# Patient Record
Sex: MALE | Race: BLACK OR AFRICAN AMERICAN | NOT HISPANIC OR LATINO | Employment: FULL TIME | URBAN - METROPOLITAN AREA
[De-identification: names, ages, dates, MRNs, and addresses within clinical notes are randomized per-mention and may not be internally consistent; named-entity substitution may affect disease eponyms.]

---

## 2017-03-02 ENCOUNTER — MEDICAL CORRESPONDENCE (OUTPATIENT)
Dept: HEALTH INFORMATION MANAGEMENT | Facility: CLINIC | Age: 37
End: 2017-03-02

## 2017-04-10 DIAGNOSIS — Z94.0 KIDNEY REPLACED BY TRANSPLANT: Primary | ICD-10-CM

## 2017-04-10 LAB
ANION GAP SERPL CALCULATED.3IONS-SCNC: 6 MMOL/L (ref 3–14)
BUN SERPL-MCNC: 19 MG/DL (ref 7–30)
CALCIUM SERPL-MCNC: 9.3 MG/DL (ref 8.5–10.1)
CHLORIDE SERPL-SCNC: 102 MMOL/L (ref 94–109)
CO2 SERPL-SCNC: 29 MMOL/L (ref 20–32)
CREAT SERPL-MCNC: 1.05 MG/DL (ref 0.66–1.25)
ERYTHROCYTE [DISTWIDTH] IN BLOOD BY AUTOMATED COUNT: 12.7 % (ref 10–15)
GFR SERPL CREATININE-BSD FRML MDRD: 79 ML/MIN/1.7M2
GLUCOSE SERPL-MCNC: 88 MG/DL (ref 70–99)
HCT VFR BLD AUTO: 45.1 % (ref 40–53)
HGB BLD-MCNC: 14.8 G/DL (ref 13.3–17.7)
MCH RBC QN AUTO: 29.8 PG (ref 26.5–33)
MCHC RBC AUTO-ENTMCNC: 32.8 G/DL (ref 31.5–36.5)
MCV RBC AUTO: 91 FL (ref 78–100)
PLATELET # BLD AUTO: 262 10E9/L (ref 150–450)
POTASSIUM SERPL-SCNC: 4.3 MMOL/L (ref 3.4–5.3)
RBC # BLD AUTO: 4.97 10E12/L (ref 4.4–5.9)
SODIUM SERPL-SCNC: 137 MMOL/L (ref 133–144)
WBC # BLD AUTO: 4 10E9/L (ref 4–11)

## 2017-04-10 PROCEDURE — 36415 COLL VENOUS BLD VENIPUNCTURE: CPT

## 2017-04-10 PROCEDURE — 85027 COMPLETE CBC AUTOMATED: CPT

## 2017-04-10 PROCEDURE — 80048 BASIC METABOLIC PNL TOTAL CA: CPT

## 2017-04-10 PROCEDURE — 80197 ASSAY OF TACROLIMUS: CPT

## 2017-04-11 LAB
TACROLIMUS BLD-MCNC: 3.6 UG/L (ref 5–15)
TME LAST DOSE: 2000 H

## 2017-04-14 ENCOUNTER — OFFICE VISIT (OUTPATIENT)
Dept: FAMILY MEDICINE | Facility: CLINIC | Age: 37
End: 2017-04-14
Payer: COMMERCIAL

## 2017-04-14 VITALS
HEIGHT: 70 IN | WEIGHT: 131.3 LBS | HEART RATE: 82 BPM | TEMPERATURE: 97.4 F | SYSTOLIC BLOOD PRESSURE: 130 MMHG | DIASTOLIC BLOOD PRESSURE: 82 MMHG | BODY MASS INDEX: 18.8 KG/M2 | OXYGEN SATURATION: 100 %

## 2017-04-14 DIAGNOSIS — A09 DIARRHEA OF INFECTIOUS ORIGIN: ICD-10-CM

## 2017-04-14 DIAGNOSIS — Z94.0 STATUS POST KIDNEY TRANSPLANT: ICD-10-CM

## 2017-04-14 DIAGNOSIS — N18.6 END-STAGE RENAL DISEASE (H): ICD-10-CM

## 2017-04-14 DIAGNOSIS — K52.9 GASTROENTERITIS: Primary | ICD-10-CM

## 2017-04-14 PROCEDURE — 87506 IADNA-DNA/RNA PROBE TQ 6-11: CPT | Performed by: FAMILY MEDICINE

## 2017-04-14 PROCEDURE — 99214 OFFICE O/P EST MOD 30 MIN: CPT | Performed by: FAMILY MEDICINE

## 2017-04-14 PROCEDURE — 87338 HPYLORI STOOL AG IA: CPT | Performed by: FAMILY MEDICINE

## 2017-04-14 RX ORDER — AMLODIPINE BESYLATE 5 MG/1
5 TABLET ORAL
COMMUNITY
Start: 2017-02-27 | End: 2021-11-04 | Stop reason: ALTCHOICE

## 2017-04-14 RX ORDER — MYCOPHENOLIC ACID 180 MG/1
180 TABLET, DELAYED RELEASE ORAL
COMMUNITY
Start: 2017-02-27

## 2017-04-14 NOTE — MR AVS SNAPSHOT
"              After Visit Summary   4/14/2017    Yeny Calvin    MRN: [id number]           Patient Information     Date Of Birth          1980        Visit Information        Provider Department      4/14/2017 11:00 AM Memo Rader MD [facility]        Today's Diagnoses     Gastroenteritis    -  1    Diarrhea of infectious origin        Status post kidney transplant        End-stage renal disease (H)           Follow-ups after your visit        Future tests that were ordered for you today     Open Future Orders        Priority Expected Expires Ordered    Enteric Bacteria and Virus Panel by LEOBARDO Stool Routine  4/14/2018 4/14/2017    H Pylori antigen, stool Routine  5/14/2017 4/14/2017            Who to contact     If you have questions or need follow up information about today's clinic visit or your schedule please contact [facility] directly at [phone].  Normal or non-critical lab and imaging results will be communicated to you by MyChart, letter or phone within 4 business days after the clinic has received the results. If you do not hear from us within 7 days, please contact the clinic through MyChart or phone. If you have a critical or abnormal lab result, we will notify you by phone as soon as possible.  Submit refill requests through [employer] or call your pharmacy and they will forward the refill request to us. Please allow 3 business days for your refill to be completed.          Additional Information About Your Visit        MyChart Information     Lumeta lets you send messages to your doctor, view your test results, renew your prescriptions, schedule appointments and more. To sign up, go to [email or web address] . Click on \"Log in\" on the left side of the screen, which will take you to the Welcome page. Then click on \"Sign up Now\" on the right side of the page.     You will be asked to enter the access code listed below, as well as some personal information. " "Please follow the directions to create your username and password.     Your access code is: [email or web address]  Expires: 2017 10:11 AM     Your access code will  in 90 days. If you need help or a new code, please call your [place] clinic or [phone].        Care EveryWhere ID     This is your Care EveryWhere ID. This could be used by other organizations to access your [place] medical records  [id number]        Your Vitals Were     Pulse Temperature Height Pulse Oximetry BMI (Body Mass Index)       82 97.4  F (36.3  C) (Oral) 5' 10\" (1.778 m) 100% 18.84 kg/m2        Blood Pressure from Last 3 Encounters:   17 130/82   16 140/84   16 108/74    Weight from Last 3 Encounters:   17 131 lb 4.8 oz (59.6 kg)   16 126 lb (57.2 kg)   16 123 lb 8 oz (56 kg)               Primary Care Provider Office Phone # Fax #    Leslie Warfa [phone]       [place] [id number] [facility] [id number]        Thank you!     Thank you for choosing [facility]  for your care. Our goal is always to provide you with excellent care. Hearing back from our patients is one way we can continue to improve our services. Please take a few minutes to complete the written survey that you may receive in the mail after your visit with us. Thank you!             Your Updated Medication List - Protect others around you: Learn how to safely use, store and throw away your medicines at www.disposemymeds.org.          This list is accurate as of: 17 12:08 PM.  Always use your most recent med list.                   Brand Name Dispense Instructions for use    amLODIPine 5 MG tablet    NORVASC     Take 5 mg by mouth       lisinopril 10 MG tablet    PRINIVIL/ZESTRIL     Take 10 mg by mouth daily       mycophenolic acid 180 MG EC tablet    MYFORTIC - GENERIC EQUIVALENT     Take 180 mg by mouth       tacrolimus capsule   Generic drug:  tacrolimus      Take 1 mg by mouth 2 " times daily

## 2017-04-14 NOTE — PROGRESS NOTES
SUBJECTIVE:                                                    Yeny Calvin is a 37 year old male who presents to clinic today for the following health issues:    Abdominal Pain      Duration: 3 days    Description (location/character/radiation): middle of stomach       Associated flank pain: None    Intensity:  moderate    Accompanying signs and symptoms:        Fever/Chills: no        Gas/Bloating: YES-        Nausea/vomitting: no        Diarrhea: YES- 3 times        Dysuria or Hematuria: no     History (previous similar pain/trauma/previous testing): none    Precipitating or alleviating factors:       Pain worse with eating/BM/urination: no       Pain relieved by BM: YES- get less painful - but still there    Therapies tried and outcome: None    LMP:  not applicable       ROS: As per HPI.  Constitutional: + fevers/sweats/chills  : no burning or urgency with urination  Skin: no rash    I have reviewed and updated the patient's past medical history in the EMR. Current problems are:    Patient Active Problem List   Diagnosis     Undescended testis     Testicular hypofunction     Atrophy of both testes     Status post kidney transplant     End-stage renal disease (H)     Hepatitis B carrier     History of kidney transplant     Hypertension     Seasonal allergic rhinitis     Past Surgical History:   Procedure Laterality Date     bilateral orchidopexy  1994     C TRANSPLANTATION OF KIDNEY       HYPERTENSION MG         Social History   Substance Use Topics     Smoking status: Never Smoker     Smokeless tobacco: Never Used     Alcohol use No     Family History   Problem Relation Age of Onset     Unknown/Adopted Mother      Unknown/Adopted Father          Allergies, reviewed:   No Known Allergies    Current Outpatient Prescriptions   Medication Sig Dispense Refill     amLODIPine (NORVASC) 5 MG tablet Take 5 mg by mouth       mycophenolic acid (MYFORTIC - GENERIC EQUIVALENT) 180 MG EC tablet Take 180 mg by mouth    "    lisinopril (PRINIVIL,ZESTRIL) 10 MG tablet Take 10 mg by mouth daily       tacrolimus (PROGRAF) 1 MG capsule Take 1 mg by mouth 2 times daily          Objective:  /82  Pulse 82  Temp 97.4  F (36.3  C) (Oral)  Ht 5' 10\" (1.778 m)  Wt 131 lb 4.8 oz (59.6 kg)  SpO2 100%  BMI 18.84 kg/m2  General Appearance: Pleasant, alert, WN/WD in no acute respiratory distress.  Chest/Respiratory Exam: Normal, comfortable, easy respirations. Chest wall normal. Lungs are clear to auscultation. No wheezing, crackles, or rhonchi.  Cardiovascular Exam: Regular rate and rhythm. No murmur, gallop, or rubs. No pedal edema.  Gastrointestinal Exam: Soft, mildly tender lower abdmen, not tender RLQ  Musculoskeletal Exam: Back is non-tender, full ROM of upper and lower extremities.  Skin: no rash, warm and dry.  Neurologic Exam: Nonfocal, no tremor. Normal gait.  Psychiatric Exam: Alert - appropriate, normal affect    ASSESSMENT/PLAN:    ICD-10-CM    1. Gastroenteritis K52.9 Enteric Bacteria and Virus Panel by LEOBARDO Stool     H Pylori antigen, stool   2. Diarrhea of infectious origin A09 Enteric Bacteria and Virus Panel by LEOBARDO Stool     H Pylori antigen, stool   3. Status post kidney transplant Z94.0       abdominal pain Differential Dx includes Gall bladder issue, ulcer, dyspepsia, pancreatitis, bowel problem, abdominal wall pain, gastroenteritis    Because pt is high risk (kidney transplant and on immunosuppesants)  Doing expanded ID workup today    Also Hx of H pylori, (Treated) checking for cure    Follow up: Return as needed if symptoms fail to improve    Memo Rader MD MPH    "

## 2017-04-14 NOTE — NURSING NOTE
"Chief Complaint   Patient presents with     Establish Care     Gastrointestinal Problem     /82  Pulse 82  Temp 97.4  F (36.3  C) (Oral)  Ht 5' 10\" (1.778 m)  Wt 131 lb 4.8 oz (59.6 kg)  SpO2 100%  BMI 18.84 kg/m2 Estimated body mass index is 18.84 kg/(m^2) as calculated from the following:    Height as of this encounter: 5' 10\" (1.778 m).    Weight as of this encounter: 131 lb 4.8 oz (59.6 kg).  BP completed using cuff size: regular       Health Maintenance due pending provider review:  NONE    n/a      Noam Bullard CMA  "

## 2017-04-15 LAB
CAMPYLOBACTER GROUP BY NAT: NOT DETECTED
ENTERIC PATHOGEN COMMENT: ABNORMAL
NOROVIRUS I AND II BY NAT: ABNORMAL
ROTAVIRUS A BY NAT: NOT DETECTED
SALMONELLA SPECIES BY NAT: NOT DETECTED
SHIGA TOXIN 1 GENE BY NAT: NOT DETECTED
SHIGA TOXIN 2 GENE BY NAT: NOT DETECTED
SHIGELLA SP+EIEC IPAH STL QL NAA+PROBE: NOT DETECTED
VIBRIO GROUP BY NAT: NOT DETECTED
YERSINIA ENTEROCOLITICA BY NAT: NOT DETECTED

## 2017-04-17 LAB
H PYLORI AG STL QL IA: NORMAL
MICRO REPORT STATUS: NORMAL
SPECIMEN SOURCE: NORMAL

## 2017-07-10 DIAGNOSIS — Z94.0 KIDNEY REPLACED BY TRANSPLANT: ICD-10-CM

## 2017-07-10 LAB
ALBUMIN UR-MCNC: NEGATIVE MG/DL
APPEARANCE UR: CLEAR
BILIRUB UR QL STRIP: NEGATIVE
COLOR UR AUTO: YELLOW
DEPRECATED CALCIDIOL+CALCIFEROL SERPL-MC: 67 UG/L (ref 20–75)
ERYTHROCYTE [DISTWIDTH] IN BLOOD BY AUTOMATED COUNT: 13.4 % (ref 10–15)
GLUCOSE UR STRIP-MCNC: NEGATIVE MG/DL
HBA1C MFR BLD: 5.4 % (ref 4.3–6)
HCT VFR BLD AUTO: 43.4 % (ref 40–53)
HGB BLD-MCNC: 14.8 G/DL (ref 13.3–17.7)
HGB UR QL STRIP: NEGATIVE
KETONES UR STRIP-MCNC: NEGATIVE MG/DL
LEUKOCYTE ESTERASE UR QL STRIP: NEGATIVE
MCH RBC QN AUTO: 30.6 PG (ref 26.5–33)
MCHC RBC AUTO-ENTMCNC: 34.1 G/DL (ref 31.5–36.5)
MCV RBC AUTO: 90 FL (ref 78–100)
NITRATE UR QL: NEGATIVE
PH UR STRIP: 5.5 PH (ref 5–7)
PLATELET # BLD AUTO: 263 10E9/L (ref 150–450)
PROT UR-MCNC: <0.05 G/L
PROT/CREAT 24H UR: NORMAL G/G CR (ref 0–0.2)
PTH-INTACT SERPL-MCNC: 80 PG/ML (ref 12–72)
RBC # BLD AUTO: 4.84 10E12/L (ref 4.4–5.9)
SP GR UR STRIP: <=1.005 (ref 1–1.03)
TACROLIMUS BLD-MCNC: 3 UG/L (ref 5–15)
TME LAST DOSE: 2000 H
URN SPEC COLLECT METH UR: NORMAL
UROBILINOGEN UR STRIP-ACNC: 0.2 EU/DL (ref 0.2–1)
WBC # BLD AUTO: 3.8 10E9/L (ref 4–11)

## 2017-07-10 PROCEDURE — 85027 COMPLETE CBC AUTOMATED: CPT | Performed by: FAMILY MEDICINE

## 2017-07-10 PROCEDURE — 82248 BILIRUBIN DIRECT: CPT | Performed by: FAMILY MEDICINE

## 2017-07-10 PROCEDURE — 87799 DETECT AGENT NOS DNA QUANT: CPT | Performed by: FAMILY MEDICINE

## 2017-07-10 PROCEDURE — 84460 ALANINE AMINO (ALT) (SGPT): CPT | Performed by: FAMILY MEDICINE

## 2017-07-10 PROCEDURE — 83970 ASSAY OF PARATHORMONE: CPT | Performed by: FAMILY MEDICINE

## 2017-07-10 PROCEDURE — 82247 BILIRUBIN TOTAL: CPT | Performed by: FAMILY MEDICINE

## 2017-07-10 PROCEDURE — 82306 VITAMIN D 25 HYDROXY: CPT | Performed by: FAMILY MEDICINE

## 2017-07-10 PROCEDURE — 84450 TRANSFERASE (AST) (SGOT): CPT | Performed by: FAMILY MEDICINE

## 2017-07-10 PROCEDURE — 83735 ASSAY OF MAGNESIUM: CPT | Performed by: FAMILY MEDICINE

## 2017-07-10 PROCEDURE — 81003 URINALYSIS AUTO W/O SCOPE: CPT | Performed by: FAMILY MEDICINE

## 2017-07-10 PROCEDURE — 84156 ASSAY OF PROTEIN URINE: CPT | Performed by: FAMILY MEDICINE

## 2017-07-10 PROCEDURE — 84155 ASSAY OF PROTEIN SERUM: CPT | Performed by: FAMILY MEDICINE

## 2017-07-10 PROCEDURE — 80069 RENAL FUNCTION PANEL: CPT | Performed by: FAMILY MEDICINE

## 2017-07-10 PROCEDURE — 80197 ASSAY OF TACROLIMUS: CPT | Performed by: FAMILY MEDICINE

## 2017-07-10 PROCEDURE — 83036 HEMOGLOBIN GLYCOSYLATED A1C: CPT | Performed by: FAMILY MEDICINE

## 2017-07-10 PROCEDURE — 84075 ASSAY ALKALINE PHOSPHATASE: CPT | Performed by: FAMILY MEDICINE

## 2017-07-10 PROCEDURE — 36415 COLL VENOUS BLD VENIPUNCTURE: CPT | Performed by: FAMILY MEDICINE

## 2017-07-11 LAB
ALBUMIN SERPL-MCNC: 4.1 G/DL (ref 3.4–5)
ALP SERPL-CCNC: 63 U/L (ref 40–150)
ALT SERPL W P-5'-P-CCNC: 30 U/L (ref 0–70)
ANION GAP SERPL CALCULATED.3IONS-SCNC: 8 MMOL/L (ref 3–14)
AST SERPL W P-5'-P-CCNC: 25 U/L (ref 0–45)
BILIRUB DIRECT SERPL-MCNC: 0.1 MG/DL (ref 0–0.2)
BILIRUB SERPL-MCNC: 0.6 MG/DL (ref 0.2–1.3)
BUN SERPL-MCNC: 12 MG/DL (ref 7–30)
CALCIUM SERPL-MCNC: 9 MG/DL (ref 8.5–10.1)
CHLORIDE SERPL-SCNC: 99 MMOL/L (ref 94–109)
CO2 SERPL-SCNC: 27 MMOL/L (ref 20–32)
CREAT SERPL-MCNC: 1.01 MG/DL (ref 0.66–1.25)
GFR SERPL CREATININE-BSD FRML MDRD: 82 ML/MIN/1.7M2
GLUCOSE SERPL-MCNC: 87 MG/DL (ref 70–99)
MAGNESIUM SERPL-MCNC: 2 MG/DL (ref 1.6–2.3)
PHOSPHATE SERPL-MCNC: 3 MG/DL (ref 2.5–4.5)
POTASSIUM SERPL-SCNC: 4.2 MMOL/L (ref 3.4–5.3)
PROT SERPL-MCNC: 7.6 G/DL (ref 6.8–8.8)
SODIUM SERPL-SCNC: 134 MMOL/L (ref 133–144)

## 2017-07-12 LAB
BKV DNA # SPEC NAA+PROBE: NORMAL COPIES/ML
BKV DNA SPEC NAA+PROBE-LOG#: NORMAL LOG COPIES/ML
SPECIMEN SOURCE: NORMAL

## 2017-12-27 DIAGNOSIS — Z94.0 KIDNEY REPLACED BY TRANSPLANT: Primary | ICD-10-CM

## 2017-12-27 DIAGNOSIS — Z79.899 DRUG THERAPY: ICD-10-CM

## 2018-01-29 DIAGNOSIS — Z94.0 KIDNEY REPLACED BY TRANSPLANT: ICD-10-CM

## 2018-01-29 DIAGNOSIS — Z79.899 DRUG THERAPY: ICD-10-CM

## 2018-01-29 LAB
ALBUMIN SERPL-MCNC: 4.1 G/DL (ref 3.4–5)
ALBUMIN UR-MCNC: NEGATIVE MG/DL
ALP SERPL-CCNC: 67 U/L (ref 40–150)
ALT SERPL W P-5'-P-CCNC: 19 U/L (ref 0–70)
ANION GAP SERPL CALCULATED.3IONS-SCNC: 8 MMOL/L (ref 3–14)
APPEARANCE UR: CLEAR
AST SERPL W P-5'-P-CCNC: 24 U/L (ref 0–45)
BASOPHILS # BLD AUTO: 0 10E9/L (ref 0–0.2)
BASOPHILS NFR BLD AUTO: 0.9 %
BILIRUB DIRECT SERPL-MCNC: <0.1 MG/DL (ref 0–0.2)
BILIRUB SERPL-MCNC: 0.5 MG/DL (ref 0.2–1.3)
BILIRUB UR QL STRIP: NEGATIVE
BUN SERPL-MCNC: 11 MG/DL (ref 7–30)
CALCIUM SERPL-MCNC: 9.4 MG/DL (ref 8.5–10.1)
CHLORIDE SERPL-SCNC: 100 MMOL/L (ref 94–109)
CHOLEST SERPL-MCNC: 242 MG/DL
CO2 SERPL-SCNC: 27 MMOL/L (ref 20–32)
COLOR UR AUTO: YELLOW
CREAT SERPL-MCNC: 1.08 MG/DL (ref 0.66–1.25)
DEPRECATED CALCIDIOL+CALCIFEROL SERPL-MC: 39 UG/L (ref 20–75)
DIFFERENTIAL METHOD BLD: NORMAL
EOSINOPHIL # BLD AUTO: 0.1 10E9/L (ref 0–0.7)
EOSINOPHIL NFR BLD AUTO: 1.1 %
ERYTHROCYTE [DISTWIDTH] IN BLOOD BY AUTOMATED COUNT: 13 % (ref 10–15)
GFR SERPL CREATININE-BSD FRML MDRD: 76 ML/MIN/1.7M2
GLUCOSE SERPL-MCNC: 96 MG/DL (ref 70–99)
GLUCOSE UR STRIP-MCNC: NEGATIVE MG/DL
HBA1C MFR BLD: 5.2 % (ref 4.3–6)
HCT VFR BLD AUTO: 46.7 % (ref 40–53)
HDLC SERPL-MCNC: 70 MG/DL
HGB BLD-MCNC: 15.9 G/DL (ref 13.3–17.7)
HGB UR QL STRIP: NEGATIVE
KETONES UR STRIP-MCNC: NEGATIVE MG/DL
LDLC SERPL CALC-MCNC: 147 MG/DL
LEUKOCYTE ESTERASE UR QL STRIP: NEGATIVE
LYMPHOCYTES # BLD AUTO: 2 10E9/L (ref 0.8–5.3)
LYMPHOCYTES NFR BLD AUTO: 46.2 %
MAGNESIUM SERPL-MCNC: 2 MG/DL (ref 1.6–2.3)
MCH RBC QN AUTO: 30.4 PG (ref 26.5–33)
MCHC RBC AUTO-ENTMCNC: 34 G/DL (ref 31.5–36.5)
MCV RBC AUTO: 89 FL (ref 78–100)
MONOCYTES # BLD AUTO: 0.3 10E9/L (ref 0–1.3)
MONOCYTES NFR BLD AUTO: 7.7 %
NEUTROPHILS # BLD AUTO: 1.9 10E9/L (ref 1.6–8.3)
NEUTROPHILS NFR BLD AUTO: 44.1 %
NITRATE UR QL: NEGATIVE
NONHDLC SERPL-MCNC: 172 MG/DL
PH UR STRIP: 6.5 PH (ref 5–7)
PHOSPHATE SERPL-MCNC: 3 MG/DL (ref 2.5–4.5)
PLATELET # BLD AUTO: 264 10E9/L (ref 150–450)
POTASSIUM SERPL-SCNC: 4.3 MMOL/L (ref 3.4–5.3)
PROT SERPL-MCNC: 7.7 G/DL (ref 6.8–8.8)
PROT UR-MCNC: <0.05 G/L
PROT/CREAT 24H UR: NORMAL G/G CR (ref 0–0.2)
PTH-INTACT SERPL-MCNC: 84 PG/ML (ref 12–72)
RBC # BLD AUTO: 5.23 10E12/L (ref 4.4–5.9)
SODIUM SERPL-SCNC: 135 MMOL/L (ref 133–144)
SOURCE: NORMAL
SP GR UR STRIP: <=1.005 (ref 1–1.03)
TACROLIMUS BLD-MCNC: 3.8 UG/L (ref 5–15)
TME LAST DOSE: 2140 H
TRIGL SERPL-MCNC: 125 MG/DL
UROBILINOGEN UR STRIP-ACNC: 0.2 EU/DL (ref 0.2–1)
WBC # BLD AUTO: 4.4 10E9/L (ref 4–11)

## 2018-01-29 PROCEDURE — 84156 ASSAY OF PROTEIN URINE: CPT

## 2018-01-29 PROCEDURE — 81003 URINALYSIS AUTO W/O SCOPE: CPT

## 2018-01-29 PROCEDURE — 84075 ASSAY ALKALINE PHOSPHATASE: CPT

## 2018-01-29 PROCEDURE — 84450 TRANSFERASE (AST) (SGOT): CPT

## 2018-01-29 PROCEDURE — 84155 ASSAY OF PROTEIN SERUM: CPT

## 2018-01-29 PROCEDURE — 82248 BILIRUBIN DIRECT: CPT

## 2018-01-29 PROCEDURE — 36415 COLL VENOUS BLD VENIPUNCTURE: CPT

## 2018-01-29 PROCEDURE — 83970 ASSAY OF PARATHORMONE: CPT

## 2018-01-29 PROCEDURE — 83735 ASSAY OF MAGNESIUM: CPT

## 2018-01-29 PROCEDURE — 83036 HEMOGLOBIN GLYCOSYLATED A1C: CPT

## 2018-01-29 PROCEDURE — 84460 ALANINE AMINO (ALT) (SGPT): CPT

## 2018-01-29 PROCEDURE — 82306 VITAMIN D 25 HYDROXY: CPT

## 2018-01-29 PROCEDURE — 82247 BILIRUBIN TOTAL: CPT

## 2018-01-29 PROCEDURE — 80069 RENAL FUNCTION PANEL: CPT

## 2018-01-29 PROCEDURE — 85025 COMPLETE CBC W/AUTO DIFF WBC: CPT

## 2018-01-29 PROCEDURE — 80061 LIPID PANEL: CPT

## 2018-01-29 PROCEDURE — 80197 ASSAY OF TACROLIMUS: CPT

## 2018-02-27 DIAGNOSIS — N46.9 MALE STERILITY: Primary | ICD-10-CM

## 2018-02-27 PROCEDURE — 86803 HEPATITIS C AB TEST: CPT

## 2018-02-27 PROCEDURE — 87389 HIV-1 AG W/HIV-1&-2 AB AG IA: CPT

## 2018-02-27 PROCEDURE — 87340 HEPATITIS B SURFACE AG IA: CPT

## 2018-02-27 PROCEDURE — 36415 COLL VENOUS BLD VENIPUNCTURE: CPT

## 2018-02-28 LAB
HBV SURFACE AG SERPL QL IA: NONREACTIVE
HCV AB SERPL QL IA: NONREACTIVE
HIV 1+2 AB+HIV1 P24 AG SERPL QL IA: NONREACTIVE

## 2018-03-16 ENCOUNTER — MEDICAL CORRESPONDENCE (OUTPATIENT)
Dept: HEALTH INFORMATION MANAGEMENT | Facility: CLINIC | Age: 38
End: 2018-03-16

## 2018-03-16 DIAGNOSIS — Z94.0 HISTORY OF KIDNEY TRANSPLANT: Primary | ICD-10-CM

## 2018-03-16 DIAGNOSIS — Z79.899 DRUG THERAPY: ICD-10-CM

## 2018-07-13 ENCOUNTER — TRANSFERRED RECORDS (OUTPATIENT)
Dept: HEALTH INFORMATION MANAGEMENT | Facility: CLINIC | Age: 38
End: 2018-07-13

## 2018-08-27 DIAGNOSIS — Z94.0 HISTORY OF KIDNEY TRANSPLANT: ICD-10-CM

## 2018-08-27 DIAGNOSIS — Z79.899 DRUG THERAPY: ICD-10-CM

## 2018-08-27 DIAGNOSIS — Z94.0 KIDNEY REPLACED BY TRANSPLANT: ICD-10-CM

## 2018-08-27 LAB
ALBUMIN SERPL-MCNC: 4.2 G/DL (ref 3.4–5)
ALBUMIN UR-MCNC: NEGATIVE MG/DL
ALP SERPL-CCNC: 67 U/L (ref 40–150)
ALT SERPL W P-5'-P-CCNC: 21 U/L (ref 0–70)
ANION GAP SERPL CALCULATED.3IONS-SCNC: 9 MMOL/L (ref 3–14)
APPEARANCE UR: CLEAR
AST SERPL W P-5'-P-CCNC: 21 U/L (ref 0–45)
BASOPHILS # BLD AUTO: 0 10E9/L (ref 0–0.2)
BASOPHILS NFR BLD AUTO: 0.6 %
BILIRUB DIRECT SERPL-MCNC: 0.1 MG/DL (ref 0–0.2)
BILIRUB SERPL-MCNC: 0.7 MG/DL (ref 0.2–1.3)
BILIRUB UR QL STRIP: NEGATIVE
BUN SERPL-MCNC: 18 MG/DL (ref 7–30)
CALCIUM SERPL-MCNC: 9.1 MG/DL (ref 8.5–10.1)
CHLORIDE SERPL-SCNC: 103 MMOL/L (ref 94–109)
CHOLEST SERPL-MCNC: 215 MG/DL
CO2 SERPL-SCNC: 26 MMOL/L (ref 20–32)
COLOR UR AUTO: YELLOW
CREAT SERPL-MCNC: 1.09 MG/DL (ref 0.66–1.25)
DEPRECATED CALCIDIOL+CALCIFEROL SERPL-MC: 39 UG/L (ref 20–75)
DIFFERENTIAL METHOD BLD: ABNORMAL
EOSINOPHIL # BLD AUTO: 0.1 10E9/L (ref 0–0.7)
EOSINOPHIL NFR BLD AUTO: 1.4 %
ERYTHROCYTE [DISTWIDTH] IN BLOOD BY AUTOMATED COUNT: 13.7 % (ref 10–15)
GFR SERPL CREATININE-BSD FRML MDRD: 75 ML/MIN/1.7M2
GLUCOSE SERPL-MCNC: 86 MG/DL (ref 70–99)
GLUCOSE UR STRIP-MCNC: NEGATIVE MG/DL
HBA1C MFR BLD: 5.4 % (ref 0–5.6)
HCT VFR BLD AUTO: 46.3 % (ref 40–53)
HDLC SERPL-MCNC: 53 MG/DL
HGB BLD-MCNC: 15.5 G/DL (ref 13.3–17.7)
HGB UR QL STRIP: NEGATIVE
KETONES UR STRIP-MCNC: NEGATIVE MG/DL
LDLC SERPL CALC-MCNC: 138 MG/DL
LEUKOCYTE ESTERASE UR QL STRIP: NEGATIVE
LYMPHOCYTES # BLD AUTO: 1.7 10E9/L (ref 0.8–5.3)
LYMPHOCYTES NFR BLD AUTO: 47.8 %
MAGNESIUM SERPL-MCNC: 1.8 MG/DL (ref 1.6–2.3)
MCH RBC QN AUTO: 30.1 PG (ref 26.5–33)
MCHC RBC AUTO-ENTMCNC: 33.5 G/DL (ref 31.5–36.5)
MCV RBC AUTO: 90 FL (ref 78–100)
MONOCYTES # BLD AUTO: 0.3 10E9/L (ref 0–1.3)
MONOCYTES NFR BLD AUTO: 7.2 %
NEUTROPHILS # BLD AUTO: 1.5 10E9/L (ref 1.6–8.3)
NEUTROPHILS NFR BLD AUTO: 43 %
NITRATE UR QL: NEGATIVE
NONHDLC SERPL-MCNC: 162 MG/DL
PH UR STRIP: 5.5 PH (ref 5–7)
PHOSPHATE SERPL-MCNC: 3.2 MG/DL (ref 2.5–4.5)
PLATELET # BLD AUTO: 236 10E9/L (ref 150–450)
POTASSIUM SERPL-SCNC: 4.1 MMOL/L (ref 3.4–5.3)
PROT SERPL-MCNC: 7.8 G/DL (ref 6.8–8.8)
PROT UR-MCNC: <0.05 G/L
PROT/CREAT 24H UR: NORMAL G/G CR (ref 0–0.2)
PTH-INTACT SERPL-MCNC: 97 PG/ML (ref 18–80)
RBC # BLD AUTO: 5.15 10E12/L (ref 4.4–5.9)
SODIUM SERPL-SCNC: 138 MMOL/L (ref 133–144)
SOURCE: NORMAL
SP GR UR STRIP: <=1.005 (ref 1–1.03)
TACROLIMUS BLD-MCNC: 3.9 UG/L (ref 5–15)
TME LAST DOSE: 2130 H
TRIGL SERPL-MCNC: 120 MG/DL
UROBILINOGEN UR STRIP-ACNC: 0.2 EU/DL (ref 0.2–1)
WBC # BLD AUTO: 3.5 10E9/L (ref 4–11)

## 2018-08-27 PROCEDURE — 82306 VITAMIN D 25 HYDROXY: CPT

## 2018-08-27 PROCEDURE — 80069 RENAL FUNCTION PANEL: CPT

## 2018-08-27 PROCEDURE — 83970 ASSAY OF PARATHORMONE: CPT

## 2018-08-27 PROCEDURE — 83735 ASSAY OF MAGNESIUM: CPT

## 2018-08-27 PROCEDURE — 84155 ASSAY OF PROTEIN SERUM: CPT

## 2018-08-27 PROCEDURE — 83036 HEMOGLOBIN GLYCOSYLATED A1C: CPT

## 2018-08-27 PROCEDURE — 84460 ALANINE AMINO (ALT) (SGPT): CPT

## 2018-08-27 PROCEDURE — 80061 LIPID PANEL: CPT

## 2018-08-27 PROCEDURE — 81003 URINALYSIS AUTO W/O SCOPE: CPT

## 2018-08-27 PROCEDURE — 36415 COLL VENOUS BLD VENIPUNCTURE: CPT

## 2018-08-27 PROCEDURE — 85025 COMPLETE CBC W/AUTO DIFF WBC: CPT

## 2018-08-27 PROCEDURE — 84075 ASSAY ALKALINE PHOSPHATASE: CPT

## 2018-08-27 PROCEDURE — 82248 BILIRUBIN DIRECT: CPT

## 2018-08-27 PROCEDURE — 84156 ASSAY OF PROTEIN URINE: CPT

## 2018-08-27 PROCEDURE — 82247 BILIRUBIN TOTAL: CPT

## 2018-08-27 PROCEDURE — 80197 ASSAY OF TACROLIMUS: CPT

## 2018-08-27 PROCEDURE — 84450 TRANSFERASE (AST) (SGOT): CPT

## 2018-12-31 DIAGNOSIS — Z94.0 HISTORY OF KIDNEY TRANSPLANT: ICD-10-CM

## 2018-12-31 DIAGNOSIS — Z79.899 DRUG THERAPY: ICD-10-CM

## 2018-12-31 LAB
ALBUMIN SERPL-MCNC: 4.1 G/DL (ref 3.4–5)
ALBUMIN UR-MCNC: NEGATIVE MG/DL
ALP SERPL-CCNC: 69 U/L (ref 40–150)
ALT SERPL W P-5'-P-CCNC: 24 U/L (ref 0–70)
ANION GAP SERPL CALCULATED.3IONS-SCNC: 7 MMOL/L (ref 3–14)
APPEARANCE UR: CLEAR
AST SERPL W P-5'-P-CCNC: 22 U/L (ref 0–45)
BILIRUB DIRECT SERPL-MCNC: 0.1 MG/DL (ref 0–0.2)
BILIRUB SERPL-MCNC: 0.6 MG/DL (ref 0.2–1.3)
BILIRUB UR QL STRIP: NEGATIVE
BUN SERPL-MCNC: 19 MG/DL (ref 7–30)
CALCIUM SERPL-MCNC: 9.5 MG/DL (ref 8.5–10.1)
CHLORIDE SERPL-SCNC: 102 MMOL/L (ref 94–109)
CHOLEST SERPL-MCNC: 249 MG/DL
CO2 SERPL-SCNC: 26 MMOL/L (ref 20–32)
COLOR UR AUTO: YELLOW
CREAT SERPL-MCNC: 1.16 MG/DL (ref 0.66–1.25)
DEPRECATED CALCIDIOL+CALCIFEROL SERPL-MC: 35 UG/L (ref 20–75)
ERYTHROCYTE [DISTWIDTH] IN BLOOD BY AUTOMATED COUNT: 13.5 % (ref 10–15)
GFR SERPL CREATININE-BSD FRML MDRD: 78 ML/MIN/{1.73_M2}
GLUCOSE SERPL-MCNC: 93 MG/DL (ref 70–99)
GLUCOSE UR STRIP-MCNC: NEGATIVE MG/DL
HBA1C MFR BLD: 5.3 % (ref 0–5.6)
HCT VFR BLD AUTO: 45.1 % (ref 40–53)
HDLC SERPL-MCNC: 55 MG/DL
HGB BLD-MCNC: 15.2 G/DL (ref 13.3–17.7)
HGB UR QL STRIP: NEGATIVE
KETONES UR STRIP-MCNC: NEGATIVE MG/DL
LDLC SERPL CALC-MCNC: 167 MG/DL
LEUKOCYTE ESTERASE UR QL STRIP: NEGATIVE
MAGNESIUM SERPL-MCNC: 2 MG/DL (ref 1.6–2.3)
MCH RBC QN AUTO: 30.5 PG (ref 26.5–33)
MCHC RBC AUTO-ENTMCNC: 33.7 G/DL (ref 31.5–36.5)
MCV RBC AUTO: 90 FL (ref 78–100)
NITRATE UR QL: NEGATIVE
NONHDLC SERPL-MCNC: 194 MG/DL
PH UR STRIP: 6 PH (ref 5–7)
PHOSPHATE SERPL-MCNC: 3.1 MG/DL (ref 2.5–4.5)
PLATELET # BLD AUTO: 267 10E9/L (ref 150–450)
POTASSIUM SERPL-SCNC: 4.5 MMOL/L (ref 3.4–5.3)
PROT SERPL-MCNC: 7.7 G/DL (ref 6.8–8.8)
PROT UR-MCNC: <0.05 G/L
PROT/CREAT 24H UR: NORMAL G/G CR (ref 0–0.2)
PTH-INTACT SERPL-MCNC: 86 PG/ML (ref 18–80)
RBC # BLD AUTO: 4.99 10E12/L (ref 4.4–5.9)
SODIUM SERPL-SCNC: 135 MMOL/L (ref 133–144)
SOURCE: NORMAL
SP GR UR STRIP: <=1.005 (ref 1–1.03)
TRIGL SERPL-MCNC: 135 MG/DL
UROBILINOGEN UR STRIP-ACNC: 0.2 EU/DL (ref 0.2–1)
WBC # BLD AUTO: 3.3 10E9/L (ref 4–11)

## 2018-12-31 PROCEDURE — 80197 ASSAY OF TACROLIMUS: CPT | Performed by: INTERNAL MEDICINE

## 2018-12-31 PROCEDURE — 80076 HEPATIC FUNCTION PANEL: CPT | Performed by: INTERNAL MEDICINE

## 2018-12-31 PROCEDURE — 83735 ASSAY OF MAGNESIUM: CPT | Performed by: INTERNAL MEDICINE

## 2018-12-31 PROCEDURE — 81003 URINALYSIS AUTO W/O SCOPE: CPT | Performed by: INTERNAL MEDICINE

## 2018-12-31 PROCEDURE — 80061 LIPID PANEL: CPT | Performed by: INTERNAL MEDICINE

## 2018-12-31 PROCEDURE — 84100 ASSAY OF PHOSPHORUS: CPT | Performed by: INTERNAL MEDICINE

## 2018-12-31 PROCEDURE — 83036 HEMOGLOBIN GLYCOSYLATED A1C: CPT | Performed by: INTERNAL MEDICINE

## 2018-12-31 PROCEDURE — 84156 ASSAY OF PROTEIN URINE: CPT | Performed by: INTERNAL MEDICINE

## 2018-12-31 PROCEDURE — 36415 COLL VENOUS BLD VENIPUNCTURE: CPT | Performed by: INTERNAL MEDICINE

## 2018-12-31 PROCEDURE — 83970 ASSAY OF PARATHORMONE: CPT | Performed by: INTERNAL MEDICINE

## 2018-12-31 PROCEDURE — 80048 BASIC METABOLIC PNL TOTAL CA: CPT | Performed by: INTERNAL MEDICINE

## 2018-12-31 PROCEDURE — 85027 COMPLETE CBC AUTOMATED: CPT | Performed by: INTERNAL MEDICINE

## 2018-12-31 PROCEDURE — 82306 VITAMIN D 25 HYDROXY: CPT | Performed by: INTERNAL MEDICINE

## 2019-03-21 ENCOUNTER — OFFICE VISIT (OUTPATIENT)
Dept: FAMILY MEDICINE | Facility: CLINIC | Age: 39
End: 2019-03-21
Payer: COMMERCIAL

## 2019-03-21 VITALS
TEMPERATURE: 98.7 F | RESPIRATION RATE: 16 BRPM | OXYGEN SATURATION: 96 % | DIASTOLIC BLOOD PRESSURE: 78 MMHG | HEIGHT: 70 IN | HEART RATE: 77 BPM | BODY MASS INDEX: 19.54 KG/M2 | WEIGHT: 136.5 LBS | SYSTOLIC BLOOD PRESSURE: 128 MMHG

## 2019-03-21 DIAGNOSIS — R51.9 ACUTE INTRACTABLE HEADACHE, UNSPECIFIED HEADACHE TYPE: Primary | ICD-10-CM

## 2019-03-21 DIAGNOSIS — D84.9 IMMUNOSUPPRESSION (H): ICD-10-CM

## 2019-03-21 PROCEDURE — 99213 OFFICE O/P EST LOW 20 MIN: CPT | Performed by: PHYSICIAN ASSISTANT

## 2019-03-21 RX ORDER — ONDANSETRON 4 MG/1
4 TABLET, FILM COATED ORAL EVERY 8 HOURS PRN
Qty: 12 TABLET | Refills: 0 | Status: SHIPPED | OUTPATIENT
Start: 2019-03-21 | End: 2021-11-04

## 2019-03-21 RX ORDER — PREDNISONE 20 MG/1
20 TABLET ORAL DAILY
Qty: 3 TABLET | Refills: 0 | Status: SHIPPED | OUTPATIENT
Start: 2019-03-21 | End: 2021-11-04

## 2019-03-21 ASSESSMENT — MIFFLIN-ST. JEOR: SCORE: 1530.41

## 2019-03-21 NOTE — PROGRESS NOTES
"  SUBJECTIVE:   Yeny Calvin is a 39 year old male who presents to clinic today for the following health issues:      Chief Complaint   Patient presents with     Pain     Headache, most on the right side over his eye, right side of head and down into his shoulder area.  This pain has also also caused nausea.     This facial pain on the right side has gotten worse- almost feels \"tingly\" at times.        Problem list and histories reviewed & adjusted, as indicated.  Additional history: headache started this Monday where the pain is achy over right side of face and eye.  Pain can go into ear a bit.  Pain is very consistent.  Was just a bit better last night after work.  Used some garlic, vinegar, drink.  Pain is consistent, no change in am, can be worse.  Tylenol not much help.  Is nauseated with this as well.    He has medical history of kidney replaced and is on chronic myfortic, prograf.    BP Readings from Last 3 Encounters:   03/21/19 128/78   04/14/17 130/82   07/25/16 140/84    Wt Readings from Last 3 Encounters:   03/21/19 61.9 kg (136 lb 8 oz)   04/14/17 59.6 kg (131 lb 4.8 oz)   07/25/16 57.2 kg (126 lb)                    Reviewed and updated as needed this visit by clinical staff  Allergies       Reviewed and updated as needed this visit by Provider         ROS:  Constitutional, HEENT, cardiovascular, pulmonary, gi and gu systems are negative, except as otherwise noted.    OBJECTIVE:     /78   Pulse 77   Temp 98.7  F (37.1  C) (Tympanic)   Resp 16   Ht 1.778 m (5' 10\")   Wt 61.9 kg (136 lb 8 oz)   SpO2 96%   BMI 19.59 kg/m    Body mass index is 19.59 kg/m .  GENERAL: alert and no distress  EYES: Eyes grossly normal to inspection and PERRL  RESP: lungs clear to auscultation - no rales, rhonchi or wheezes  CV: regular rate and rhythm, normal S1 S2, no S3 or S4, no murmur, click or rub, no peripheral edema and peripheral pulses strong  NEURO: Normal strength and tone, mentation intact and " speech normal  PSYCH: mentation appears normal, affect normal/bright    Diagnostic Test Results:  none     ASSESSMENT/PLAN:             1. Acute intractable headache, unspecified headache type  Will treat symptomatic for with zofran and prednisone.  Due to lack of previous history, will future MRI if symptoms persist or worsen   - ondansetron (ZOFRAN) 4 MG tablet; Take 1 tablet (4 mg) by mouth every 8 hours as needed for nausea  Dispense: 12 tablet; Refill: 0  - predniSONE (DELTASONE) 20 MG tablet; Take 20 mg by mouth daily.  Dispense: 3 tablet; Refill: 0  - MR Brain w/o Contrast; Future    2. Immunosuppression (H)  Follows with transplant.          Mars Vallecillo PA-C  [facility]

## 2019-03-25 DIAGNOSIS — Z79.899 DRUG THERAPY: ICD-10-CM

## 2019-03-25 DIAGNOSIS — Z94.0 KIDNEY REPLACED BY TRANSPLANT: ICD-10-CM

## 2019-03-25 LAB
ALBUMIN SERPL-MCNC: 4 G/DL (ref 3.4–5)
ALBUMIN UR-MCNC: NEGATIVE MG/DL
ALP SERPL-CCNC: 65 U/L (ref 40–150)
ALT SERPL W P-5'-P-CCNC: 23 U/L (ref 0–70)
ANION GAP SERPL CALCULATED.3IONS-SCNC: 7 MMOL/L (ref 3–14)
APPEARANCE UR: CLEAR
AST SERPL W P-5'-P-CCNC: 20 U/L (ref 0–45)
BILIRUB DIRECT SERPL-MCNC: 0.1 MG/DL (ref 0–0.2)
BILIRUB SERPL-MCNC: 0.4 MG/DL (ref 0.2–1.3)
BILIRUB UR QL STRIP: NEGATIVE
BUN SERPL-MCNC: 16 MG/DL (ref 7–30)
CALCIUM SERPL-MCNC: 9.4 MG/DL (ref 8.5–10.1)
CHLORIDE SERPL-SCNC: 101 MMOL/L (ref 94–109)
CHOLEST SERPL-MCNC: 253 MG/DL
CO2 SERPL-SCNC: 26 MMOL/L (ref 20–32)
COLOR UR AUTO: YELLOW
CREAT SERPL-MCNC: 1.1 MG/DL (ref 0.66–1.25)
ERYTHROCYTE [DISTWIDTH] IN BLOOD BY AUTOMATED COUNT: 12.9 % (ref 10–15)
GFR SERPL CREATININE-BSD FRML MDRD: 83 ML/MIN/{1.73_M2}
GLUCOSE SERPL-MCNC: 98 MG/DL (ref 70–99)
GLUCOSE UR STRIP-MCNC: NEGATIVE MG/DL
HBA1C MFR BLD: 5.4 % (ref 0–5.6)
HCT VFR BLD AUTO: 44.5 % (ref 40–53)
HDLC SERPL-MCNC: 53 MG/DL
HGB BLD-MCNC: 14.9 G/DL (ref 13.3–17.7)
HGB UR QL STRIP: NEGATIVE
KETONES UR STRIP-MCNC: NEGATIVE MG/DL
LDLC SERPL CALC-MCNC: 168 MG/DL
LEUKOCYTE ESTERASE UR QL STRIP: NEGATIVE
MAGNESIUM SERPL-MCNC: 1.9 MG/DL (ref 1.6–2.3)
MCH RBC QN AUTO: 30.2 PG (ref 26.5–33)
MCHC RBC AUTO-ENTMCNC: 33.5 G/DL (ref 31.5–36.5)
MCV RBC AUTO: 90 FL (ref 78–100)
NITRATE UR QL: NEGATIVE
NONHDLC SERPL-MCNC: 200 MG/DL
PH UR STRIP: 6.5 PH (ref 5–7)
PHOSPHATE SERPL-MCNC: 3.3 MG/DL (ref 2.5–4.5)
PLATELET # BLD AUTO: 275 10E9/L (ref 150–450)
POTASSIUM SERPL-SCNC: 4.2 MMOL/L (ref 3.4–5.3)
PROT SERPL-MCNC: 7.7 G/DL (ref 6.8–8.8)
PROT UR-MCNC: <0.05 G/L
PROT/CREAT 24H UR: NORMAL G/G CR (ref 0–0.2)
PTH-INTACT SERPL-MCNC: 112 PG/ML (ref 18–80)
RBC # BLD AUTO: 4.93 10E12/L (ref 4.4–5.9)
SODIUM SERPL-SCNC: 134 MMOL/L (ref 133–144)
SOURCE: NORMAL
SP GR UR STRIP: <=1.005 (ref 1–1.03)
TACROLIMUS BLD-MCNC: 3.6 UG/L (ref 5–15)
TME LAST DOSE: 2100 H
TRIGL SERPL-MCNC: 160 MG/DL
UROBILINOGEN UR STRIP-ACNC: 0.2 EU/DL (ref 0.2–1)
WBC # BLD AUTO: 4.5 10E9/L (ref 4–11)

## 2019-03-25 PROCEDURE — 84100 ASSAY OF PHOSPHORUS: CPT

## 2019-03-25 PROCEDURE — 80197 ASSAY OF TACROLIMUS: CPT

## 2019-03-25 PROCEDURE — 82306 VITAMIN D 25 HYDROXY: CPT

## 2019-03-25 PROCEDURE — 81003 URINALYSIS AUTO W/O SCOPE: CPT

## 2019-03-25 PROCEDURE — 80061 LIPID PANEL: CPT

## 2019-03-25 PROCEDURE — 80048 BASIC METABOLIC PNL TOTAL CA: CPT

## 2019-03-25 PROCEDURE — 83735 ASSAY OF MAGNESIUM: CPT

## 2019-03-25 PROCEDURE — 36415 COLL VENOUS BLD VENIPUNCTURE: CPT

## 2019-03-25 PROCEDURE — 85027 COMPLETE CBC AUTOMATED: CPT

## 2019-03-25 PROCEDURE — 83036 HEMOGLOBIN GLYCOSYLATED A1C: CPT

## 2019-03-25 PROCEDURE — 84156 ASSAY OF PROTEIN URINE: CPT

## 2019-03-25 PROCEDURE — 80076 HEPATIC FUNCTION PANEL: CPT

## 2019-03-25 PROCEDURE — 83970 ASSAY OF PARATHORMONE: CPT

## 2019-03-26 LAB — DEPRECATED CALCIDIOL+CALCIFEROL SERPL-MC: 38 UG/L (ref 20–75)

## 2019-03-29 PROBLEM — D84.9 IMMUNOSUPPRESSION (H): Status: ACTIVE | Noted: 2019-03-29

## 2019-08-09 DIAGNOSIS — Z94.0 KIDNEY REPLACED BY TRANSPLANT: Primary | ICD-10-CM

## 2019-08-16 DIAGNOSIS — Z79.899 DRUG THERAPY: ICD-10-CM

## 2019-08-16 DIAGNOSIS — Z94.0 KIDNEY REPLACED BY TRANSPLANT: ICD-10-CM

## 2019-08-16 LAB
ALBUMIN SERPL-MCNC: 4 G/DL (ref 3.4–5)
ALBUMIN UR-MCNC: NEGATIVE MG/DL
ALP SERPL-CCNC: 71 U/L (ref 40–150)
ALT SERPL W P-5'-P-CCNC: 28 U/L (ref 0–70)
ANION GAP SERPL CALCULATED.3IONS-SCNC: 7 MMOL/L (ref 3–14)
APPEARANCE UR: CLEAR
AST SERPL W P-5'-P-CCNC: 23 U/L (ref 0–45)
BILIRUB DIRECT SERPL-MCNC: 0.1 MG/DL (ref 0–0.2)
BILIRUB SERPL-MCNC: 0.4 MG/DL (ref 0.2–1.3)
BILIRUB UR QL STRIP: NEGATIVE
BUN SERPL-MCNC: 17 MG/DL (ref 7–30)
CALCIUM SERPL-MCNC: 9.2 MG/DL (ref 8.5–10.1)
CHLORIDE SERPL-SCNC: 101 MMOL/L (ref 94–109)
CHOLEST SERPL-MCNC: 205 MG/DL
CO2 SERPL-SCNC: 26 MMOL/L (ref 20–32)
COLOR UR AUTO: YELLOW
CREAT SERPL-MCNC: 1.1 MG/DL (ref 0.66–1.25)
ERYTHROCYTE [DISTWIDTH] IN BLOOD BY AUTOMATED COUNT: 12.9 % (ref 10–15)
GFR SERPL CREATININE-BSD FRML MDRD: 83 ML/MIN/{1.73_M2}
GLUCOSE SERPL-MCNC: 95 MG/DL (ref 70–99)
GLUCOSE UR STRIP-MCNC: NEGATIVE MG/DL
HCT VFR BLD AUTO: 43.5 % (ref 40–53)
HDLC SERPL-MCNC: 50 MG/DL
HGB BLD-MCNC: 14.6 G/DL (ref 13.3–17.7)
HGB UR QL STRIP: NEGATIVE
KETONES UR STRIP-MCNC: NEGATIVE MG/DL
LDLC SERPL CALC-MCNC: 124 MG/DL
LEUKOCYTE ESTERASE UR QL STRIP: NEGATIVE
MAGNESIUM SERPL-MCNC: 1.9 MG/DL (ref 1.6–2.3)
MCH RBC QN AUTO: 30.2 PG (ref 26.5–33)
MCHC RBC AUTO-ENTMCNC: 33.6 G/DL (ref 31.5–36.5)
MCV RBC AUTO: 90 FL (ref 78–100)
NITRATE UR QL: NEGATIVE
NONHDLC SERPL-MCNC: 155 MG/DL
PH UR STRIP: 6 PH (ref 5–7)
PHOSPHATE SERPL-MCNC: 3.1 MG/DL (ref 2.5–4.5)
PLATELET # BLD AUTO: 238 10E9/L (ref 150–450)
POTASSIUM SERPL-SCNC: 4.3 MMOL/L (ref 3.4–5.3)
PROT SERPL-MCNC: 7.5 G/DL (ref 6.8–8.8)
PROT UR-MCNC: <0.05 G/L
PROT/CREAT 24H UR: NORMAL G/G CR (ref 0–0.2)
PTH-INTACT SERPL-MCNC: 89 PG/ML (ref 18–80)
RBC # BLD AUTO: 4.84 10E12/L (ref 4.4–5.9)
SODIUM SERPL-SCNC: 134 MMOL/L (ref 133–144)
SOURCE: NORMAL
SP GR UR STRIP: <=1.005 (ref 1–1.03)
TACROLIMUS BLD-MCNC: 4.7 UG/L (ref 5–15)
TME LAST DOSE: 2000 H
TRIGL SERPL-MCNC: 154 MG/DL
UROBILINOGEN UR STRIP-ACNC: 0.2 EU/DL (ref 0.2–1)
WBC # BLD AUTO: 3.6 10E9/L (ref 4–11)

## 2019-08-16 PROCEDURE — 84100 ASSAY OF PHOSPHORUS: CPT

## 2019-08-16 PROCEDURE — 83735 ASSAY OF MAGNESIUM: CPT

## 2019-08-16 PROCEDURE — 80076 HEPATIC FUNCTION PANEL: CPT

## 2019-08-16 PROCEDURE — 80197 ASSAY OF TACROLIMUS: CPT

## 2019-08-16 PROCEDURE — 80048 BASIC METABOLIC PNL TOTAL CA: CPT

## 2019-08-16 PROCEDURE — 84156 ASSAY OF PROTEIN URINE: CPT

## 2019-08-16 PROCEDURE — 80061 LIPID PANEL: CPT

## 2019-08-16 PROCEDURE — 81003 URINALYSIS AUTO W/O SCOPE: CPT

## 2019-08-16 PROCEDURE — 36415 COLL VENOUS BLD VENIPUNCTURE: CPT

## 2019-08-16 PROCEDURE — 85027 COMPLETE CBC AUTOMATED: CPT

## 2019-08-16 PROCEDURE — 83970 ASSAY OF PARATHORMONE: CPT

## 2019-08-16 PROCEDURE — 82306 VITAMIN D 25 HYDROXY: CPT

## 2019-08-19 LAB — DEPRECATED CALCIDIOL+CALCIFEROL SERPL-MC: 32 UG/L (ref 20–75)

## 2019-11-26 ENCOUNTER — OFFICE VISIT (OUTPATIENT)
Dept: FAMILY MEDICINE | Facility: CLINIC | Age: 39
End: 2019-11-26
Payer: COMMERCIAL

## 2019-11-26 VITALS
OXYGEN SATURATION: 99 % | BODY MASS INDEX: 20.3 KG/M2 | DIASTOLIC BLOOD PRESSURE: 89 MMHG | WEIGHT: 141.5 LBS | TEMPERATURE: 98.2 F | SYSTOLIC BLOOD PRESSURE: 131 MMHG | HEART RATE: 74 BPM

## 2019-11-26 DIAGNOSIS — Z71.84 ENCOUNTER FOR COUNSELING FOR TRAVEL: Primary | ICD-10-CM

## 2019-11-26 DIAGNOSIS — Z23 NEED FOR IMMUNIZATION AGAINST TYPHOID: ICD-10-CM

## 2019-11-26 DIAGNOSIS — Z23 NEED FOR DIPHTHERIA-TETANUS-PERTUSSIS (TDAP) VACCINE: ICD-10-CM

## 2019-11-26 DIAGNOSIS — Z23 NEED FOR HEPATITIS A VACCINATION: ICD-10-CM

## 2019-11-26 PROCEDURE — 90691 TYPHOID VACCINE IM: CPT | Performed by: PHYSICIAN ASSISTANT

## 2019-11-26 PROCEDURE — 99401 PREV MED CNSL INDIV APPRX 15: CPT | Mod: 25 | Performed by: PHYSICIAN ASSISTANT

## 2019-11-26 PROCEDURE — 90472 IMMUNIZATION ADMIN EACH ADD: CPT | Performed by: PHYSICIAN ASSISTANT

## 2019-11-26 PROCEDURE — 90632 HEPA VACCINE ADULT IM: CPT | Performed by: PHYSICIAN ASSISTANT

## 2019-11-26 PROCEDURE — 90715 TDAP VACCINE 7 YRS/> IM: CPT | Performed by: PHYSICIAN ASSISTANT

## 2019-11-26 PROCEDURE — 90471 IMMUNIZATION ADMIN: CPT | Performed by: PHYSICIAN ASSISTANT

## 2019-11-26 RX ORDER — AZITHROMYCIN 500 MG/1
TABLET, FILM COATED ORAL
Qty: 12 TABLET | Refills: 0 | Status: SHIPPED | OUTPATIENT
Start: 2019-11-26 | End: 2021-11-04

## 2019-11-26 NOTE — PROGRESS NOTES
SUBJECTIVE: Yeny Calvin , a 39 year old  male, presents for counseling and information regarding upcoming travel to [facility]. Special medical concerns include: history of kidney transplant. He anticipates the following unusual exposures: none.    Itinerary:  [facility]    Departure Date: 12/08/19 Return date: 03/04/19    Reason for travel (i.e. Business, pleasure): visiting     Visiting an urban or rural area?: urban     Accommodations (i.e. hotel, hostel, friends, family, etc): family        IMMUNIZATION HISTORY  Have you received any vaccinations in the past 4 weeks?  No  Have you ever fainted from having your blood drawn or from an injection?  No  Have you ever had a fever reaction to vaccination?  No  Have you ever had any bad reaction or side effect from any vaccination?  No  Have you ever had hepatitis A or B vaccine?  No  Do you live (or work closely) with anyone who has AIDS, an AIDS-like condition, any other immune disorder or who is on chemotherapy for cancer?  No  Have you received any injection of immune globulin or any blood products during the past 12 months?  No    GENERAL MEDICAL HISTORY  Do you have a medical condition that warrants maintenance medication or physician follow-up?  No  Do you have a medical condition that is stable now, but that may recur while traveling?  No  Has your spleen been removed?  No  Have you had an acute illness or a fever in the past 48 hours?  Yes  Are you pregnant, or might you become pregnant on this trip?  Any chance of pregnancy?  No  Are you breastfeeding?  No  Do you have HIV, AIDS, an AIDS-like condition, any other immune disorder, leukemia or cancer?  No  Do you have a severe combined immunodeficiency disease?  No  Have you had your thymus gland removed or history of problems with your thymus, such as myasthenia gravis, DiGeorge syndrome, or thymoma?  No    Do you have severe thrombocytopenia (low platelet count) or a coagulation disorder?  No  Have you ever had a  convulsion, seizure, epilepsy, neurologic condition or brain infection?  No  Do you have any stomach conditions?  No  Do you have a G6PD deficiency?  No  Do you have severe renal or kidney impairment?  No  Do you have a history of psychiatric problems?  No  Do you have a problem with strange dreams and/or nightmares?  No  Do you have insomnia?  No  Do you have problems with vaginitis?  No  Do you have psoriasis?  No  Are you prone to motion sickness?  No  Have you ever had headaches, nausea, vomiting, or breathing problems from altitude exposure?  No      Past Medical History:   Diagnosis Date     Kidney failure      S/P kidney transplant      Undescended testes     brought down late, about age 12-14.      Immunization History   Administered Date(s) Administered     MMR 11/17/1993, 10/12/1994     Mantoux Tuberculin Skin Test 08/24/2006     Pneumococcal 23 valent 01/13/2006     Poliovirus, inactivated (IPV) 11/19/1993, 10/12/1994     TDAP Vaccine (Adacel) 01/13/2006     Td (Adult), Adsorbed 11/17/1993, 10/12/1994, 04/04/1995, 01/13/2006       Current Outpatient Medications   Medication Sig Dispense Refill     amLODIPine (NORVASC) 5 MG tablet Take 5 mg by mouth       lisinopril (PRINIVIL,ZESTRIL) 10 MG tablet Take 10 mg by mouth daily       mycophenolic acid (MYFORTIC - GENERIC EQUIVALENT) 180 MG EC tablet Take 180 mg by mouth       ondansetron (ZOFRAN) 4 MG tablet Take 1 tablet (4 mg) by mouth every 8 hours as needed for nausea 12 tablet 0     predniSONE (DELTASONE) 20 MG tablet Take 20 mg by mouth daily. 3 tablet 0     tacrolimus (PROGRAF) 1 MG capsule Take 1 mg by mouth 2 times daily        No Known Allergies     EXAM: deferred    Immunizations discussed include: Hepatitis A, Typhoid and Tetanus/Diphtheria  Malaraia prophylaxis recommended: not needed- [place]  Symptomatic treatment for traveler's diarrhea: bismuth subsalicylate, loperamide/diphenoxylate and azithromycin    ASSESSMENT/PLAN:    (Z71.84) Encounter for  counseling for travel  (primary encounter diagnosis)    Comment: Hepatitis A, typhoid, and tdap vaccines today. Patient will return or follow-up with PCP in 6 months for Hep A booster. Prophylaxis given for Traveler's diarrhea and is not needed for Malaria. All questions were answered.     Plan: azithromycin (ZITHROMAX) 500 MG tablet            (Z23) Need for hepatitis A vaccination  Comment:   Plan: HEPA VACCINE ADULT IM            (Z23) Need for immunization against typhoid  Comment:   Plan: TYPHOID VACCINE, IM            (Z23) Need for diphtheria-tetanus-pertussis (Tdap) vaccine  Comment:   Plan: TDAP, IM (10 - 64 YRS) - Adacel              I have reviewed general recommendations for safe travel   including: food/water precautions, insect avoidance, safe sex   practices given high prevalence of HIV and other STDs,   roadway safety. Educational materials and links to the CDC   Traveler's health website have been provided.    Total time 15 minutes, greater than 50 percent in counseling   and coordination of care.

## 2019-11-26 NOTE — PATIENT INSTRUCTIONS
"See travel packet provided  Recommend ultrathon (mosquito repellant), pepto bismol and imodium  The food and drink choices you make while traveling can impact your likelihood of getting sick.   If you aren't sure if a food or drink is safe, the saying \" BOIL IT, COOK IT, PEEL IT, OR FORGET IT\" can help you decide whether it's okay to consume.   Also bring hand  and sun screen with you.  Safe Travels       Today November 26, 2019 you received the    Hepatitis A Vaccine - Please return on 5/26/20 or later for your 2nd and final dose.    Tetanus (Tdap) Vaccine    Typhoid - injectable. This vaccine is valid for two years.   .    These appointments can be made as a NURSE ONLY visit.    **It is very important for the vaccinations to be given on the scheduled day(s), this helps ensure you receive the full effectiveness of the vaccine.**    Please call [facility] with any questions [phone]    Thank you for visiting [facility]    "

## 2019-12-02 DIAGNOSIS — Z94.0 KIDNEY REPLACED BY TRANSPLANT: ICD-10-CM

## 2019-12-02 LAB
ALBUMIN SERPL-MCNC: 3.9 G/DL (ref 3.4–5)
ALBUMIN UR-MCNC: NEGATIVE MG/DL
ALP SERPL-CCNC: 73 U/L (ref 40–150)
ALT SERPL W P-5'-P-CCNC: 28 U/L (ref 0–70)
ANION GAP SERPL CALCULATED.3IONS-SCNC: 8 MMOL/L (ref 3–14)
APPEARANCE UR: CLEAR
AST SERPL W P-5'-P-CCNC: 20 U/L (ref 0–45)
BILIRUB DIRECT SERPL-MCNC: 0.1 MG/DL (ref 0–0.2)
BILIRUB SERPL-MCNC: 0.4 MG/DL (ref 0.2–1.3)
BILIRUB UR QL STRIP: NEGATIVE
BUN SERPL-MCNC: 13 MG/DL (ref 7–30)
CALCIUM SERPL-MCNC: 9 MG/DL (ref 8.5–10.1)
CHLORIDE SERPL-SCNC: 102 MMOL/L (ref 94–109)
CHOLEST SERPL-MCNC: 219 MG/DL
CO2 SERPL-SCNC: 25 MMOL/L (ref 20–32)
COLOR UR AUTO: YELLOW
CREAT SERPL-MCNC: 1.07 MG/DL (ref 0.66–1.25)
CREAT UR-MCNC: 14 MG/DL
ERYTHROCYTE [DISTWIDTH] IN BLOOD BY AUTOMATED COUNT: 13.1 % (ref 10–15)
GFR SERPL CREATININE-BSD FRML MDRD: 85 ML/MIN/{1.73_M2}
GLUCOSE SERPL-MCNC: 104 MG/DL (ref 70–99)
GLUCOSE UR STRIP-MCNC: NEGATIVE MG/DL
HBA1C MFR BLD: 5.4 % (ref 0–5.6)
HCT VFR BLD AUTO: 43.7 % (ref 40–53)
HDLC SERPL-MCNC: 48 MG/DL
HGB BLD-MCNC: 14.5 G/DL (ref 13.3–17.7)
HGB UR QL STRIP: NEGATIVE
KETONES UR STRIP-MCNC: NEGATIVE MG/DL
LDLC SERPL CALC-MCNC: 143 MG/DL
LEUKOCYTE ESTERASE UR QL STRIP: NEGATIVE
MAGNESIUM SERPL-MCNC: 1.6 MG/DL (ref 1.6–2.3)
MCH RBC QN AUTO: 29.8 PG (ref 26.5–33)
MCHC RBC AUTO-ENTMCNC: 33.2 G/DL (ref 31.5–36.5)
MCV RBC AUTO: 90 FL (ref 78–100)
NITRATE UR QL: NEGATIVE
NONHDLC SERPL-MCNC: 171 MG/DL
PH UR STRIP: 6.5 PH (ref 5–7)
PHOSPHATE SERPL-MCNC: 3.2 MG/DL (ref 2.5–4.5)
PLATELET # BLD AUTO: 290 10E9/L (ref 150–450)
POTASSIUM SERPL-SCNC: 3.9 MMOL/L (ref 3.4–5.3)
PROT SERPL-MCNC: 7.5 G/DL (ref 6.8–8.8)
PROT UR-MCNC: <0.05 G/L
PROT/CREAT 24H UR: NORMAL G/G CR (ref 0–0.2)
PTH-INTACT SERPL-MCNC: 72 PG/ML (ref 18–80)
RBC # BLD AUTO: 4.87 10E12/L (ref 4.4–5.9)
SODIUM SERPL-SCNC: 135 MMOL/L (ref 133–144)
SOURCE: NORMAL
SP GR UR STRIP: <=1.005 (ref 1–1.03)
TACROLIMUS BLD-MCNC: 5.6 UG/L (ref 5–15)
TME LAST DOSE: 2000 H
TRIGL SERPL-MCNC: 138 MG/DL
UROBILINOGEN UR STRIP-ACNC: 0.2 EU/DL (ref 0.2–1)
WBC # BLD AUTO: 4 10E9/L (ref 4–11)

## 2019-12-02 PROCEDURE — 83970 ASSAY OF PARATHORMONE: CPT

## 2019-12-02 PROCEDURE — 82306 VITAMIN D 25 HYDROXY: CPT

## 2019-12-02 PROCEDURE — 84156 ASSAY OF PROTEIN URINE: CPT

## 2019-12-02 PROCEDURE — 80061 LIPID PANEL: CPT

## 2019-12-02 PROCEDURE — 83735 ASSAY OF MAGNESIUM: CPT

## 2019-12-02 PROCEDURE — 80197 ASSAY OF TACROLIMUS: CPT

## 2019-12-02 PROCEDURE — 80076 HEPATIC FUNCTION PANEL: CPT

## 2019-12-02 PROCEDURE — 36415 COLL VENOUS BLD VENIPUNCTURE: CPT

## 2019-12-02 PROCEDURE — 83036 HEMOGLOBIN GLYCOSYLATED A1C: CPT

## 2019-12-02 PROCEDURE — 84100 ASSAY OF PHOSPHORUS: CPT

## 2019-12-02 PROCEDURE — 80048 BASIC METABOLIC PNL TOTAL CA: CPT

## 2019-12-02 PROCEDURE — 85027 COMPLETE CBC AUTOMATED: CPT

## 2019-12-02 PROCEDURE — 81003 URINALYSIS AUTO W/O SCOPE: CPT

## 2019-12-03 LAB — DEPRECATED CALCIDIOL+CALCIFEROL SERPL-MC: 33 UG/L (ref 20–75)

## 2020-04-28 ENCOUNTER — OFFICE VISIT (OUTPATIENT)
Dept: URGENT CARE | Facility: URGENT CARE | Age: 40
End: 2020-04-28
Payer: COMMERCIAL

## 2020-04-28 ENCOUNTER — RESULTS ONLY (OUTPATIENT)
Dept: LAB | Age: 40
End: 2020-04-28

## 2020-04-28 DIAGNOSIS — Z53.9 ERRONEOUS ENCOUNTER--DISREGARD: Primary | ICD-10-CM

## 2020-04-28 NOTE — PATIENT INSTRUCTIONS
[facility] Employee Health team will receive your Covid 19 test results in the next 1-4 days.  Once your results are received, Employee Health will call you with your test result and discuss your Return to Work timeline and criteria.

## 2020-04-29 LAB
SARS-COV-2 RNA SPEC QL NAA+PROBE: NOT DETECTED
SPECIMEN SOURCE: NORMAL

## 2021-05-15 ENCOUNTER — TRANSFERRED RECORDS (OUTPATIENT)
Dept: HEALTH INFORMATION MANAGEMENT | Facility: CLINIC | Age: 41
End: 2021-05-15

## 2021-11-02 NOTE — PROGRESS NOTES
"Nurse Note      Itinerary:  [facility]      Departure Date: 11/8/21      Return Date: 01/31/22      Length of Trip 3 months      Reason for Travel: Visiting friends and relatives           Urban or rural: both      Accommodations: Family home        IMMUNIZATION HISTORY  Have you received any immunizations within the past 4 weeks?  No  Have you ever fainted from having your blood drawn or from an injection?  No  Have you ever had a fever reaction to vaccination?  No  Have you ever had any bad reaction or side effect from any vaccination?  No  Have you ever had hepatitis A or B vaccine?  Yes  Do you live (or work closely) with anyone who has AIDS, an AIDS-like condition, any other immune disorder or who is on chemotherapy for cancer?  No  Do you have a family history of immunodeficiency?  No  Have you received any injection of immune globulin or any blood products during the past 12 months?  No    Patient roomed by Clarissa Calvin is a 41 year old male seen today with friend for counsultation for international travel.   Patient will be departing in  4 day(s) and  traveling with friend.      Patient itinerary :  will be in the [facility]  region of [facility] ( [facility]) which risk for Malaria, food borne illnesses, Typhoid and Covid. exposure.      Patient's activities will include visiting friends and relatives.    Patient's country [place] birth is [place]    Special medical concerns: Immune compromised s/p Kidney transplant in 2007  Pre-travel questionnaire was completed by patient and reviewed by provider.     Vitals: BP (!) 144/93   Pulse 96   Temp 98.7  F (37.1  C)   Ht 1.778 m (5' 10\")   Wt 66.7 kg (147 lb)   SpO2 97%   BMI 21.09 kg/m    BMI= Body mass index is 21.09 kg/m .    EXAM:  General:  Well-nourished, well-developed in no acute distress.  Appears to be stated age, interacts appropriately and expresses understanding of information given to patient.    Current Outpatient Medications "   Medication Sig Dispense Refill     azithromycin (ZITHROMAX) 500 MG tablet Take 1 tablet (500 mg) by mouth daily for 3 doses Take 1 tablet a day for up to 3 days for severe diarrhea 3 tablet 0     doxycycline hyclate (VIBRAMYCIN) 100 MG capsule Take 1 capsule (100 mg) by mouth 2 times daily 100 capsule 0     lisinopril (PRINIVIL,ZESTRIL) 10 MG tablet Take 10 mg by mouth daily       mycophenolic acid (MYFORTIC - GENERIC EQUIVALENT) 180 MG EC tablet Take 180 mg by mouth       tacrolimus (PROGRAF) 1 MG capsule Take 1 mg by mouth 2 times daily        Patient Active Problem List   Diagnosis     Undescended testis     Testicular hypofunction     Atrophy of both testes     Status post kidney transplant     End-stage renal disease (H)     Hepatitis B carrier (H)     History of kidney transplant     Hypertension     Seasonal allergic rhinitis     Immunosuppression (H)     Allergies   Allergen Reactions     Latex Other (See Comments)     No reaction listed in [facility]         Immunizations discussed include:   Covid 19: Up to date  (plans to get booster dose in a couple of days)  Hepatitis A:  Ordered/given today, risks, benefits and side effects reviewed  Hepatitis B: Hep B carrier  Influenza: Up to date  Typhoid: Ordered/given today, risks, benefits and side effects reviewed  Rabies: Declined  reviewed managment of a animal bite or scratch (washing wound, seek medical care within 24 hours for post exposure prophylaxis ) and Insufficient time to vaccinate  Yellow Fever: Contraindicated: waiver given, no to low risk  Japanese Encephalitis: Not indicated  Meningococcus: Ordered/given today, risks, benefits and side effects reviewed  Tetanus/Diphtheria: Up to date  Measles/Mumps/Rubella: Up to date  Cholera: Not needed  Polio: Declined  Not concerned about risk of disease  Pneumococcal:   ( due for PCV13)  deferred  Varicella: contrainidicated  Shingrix: Not indicated  HPV:  Not indicated     TB: test after travel      ASSESSMENT/PLAN:  Yeny was seen today for travel clinic.    Diagnoses and all orders for this visit:    Travel advice encounter  -     HEPATITIS A VACCINE (ADULT)  -     TYPHOID VACCINE, IM  -     MENINGOCOCCAL (MENACTRA )  -     azithromycin (ZITHROMAX) 500 MG tablet; Take 1 tablet (500 mg) by mouth daily for 3 doses Take 1 tablet a day for up to 3 days for severe diarrhea  -     doxycycline hyclate (VIBRAMYCIN) 100 MG capsule; Take 1 capsule (100 mg) by mouth 2 times daily    Encounter for screening laboratory testing for COVID-19 virus  -     Asymptomatic COVID-19 Virus (Coronavirus) by PCR Nose; Future  -     Asymptomatic COVID-19 Virus (Coronavirus) by PCR Nose      I have reviewed general recommendations for safe travel   including: food/water precautions, insect precautions, safer sex   practices given high prevalence of Zika, HIV and other STDs,   roadway safety. Educational materials and [place] report provided.    Malaraia prophylaxis recommended: doxycycline  Symptomatic treatment for traveler's diarrhea: azithromycin  [place] illness prevention and treatment: none    Covid 19 PCR test ordered.  Instructions for scheduling and resulting through My Chart given to patient.     Personal protective measures reviewed including hand sanitizing and contact precautions for the prevention of viral illnesses. Cover coughs and masking  during travel and upon return.  Current COVID 19 pandemic.   Monitor / follow current CDC guidelines.    Country specific and CDC Covid 19  testing requirements and resources given to patient.      Evacuation insurance advised and resources were provided to patient.    Total visit time 45 minutes  with over 50% of time spent counseling patient as detailed above.    Zina Apple CNP

## 2021-11-04 ENCOUNTER — OFFICE VISIT (OUTPATIENT)
Dept: FAMILY MEDICINE | Facility: CLINIC | Age: 41
End: 2021-11-04
Payer: COMMERCIAL

## 2021-11-04 VITALS
BODY MASS INDEX: 21.05 KG/M2 | DIASTOLIC BLOOD PRESSURE: 93 MMHG | HEART RATE: 96 BPM | SYSTOLIC BLOOD PRESSURE: 144 MMHG | TEMPERATURE: 98.7 F | OXYGEN SATURATION: 97 % | HEIGHT: 70 IN | WEIGHT: 147 LBS

## 2021-11-04 DIAGNOSIS — D84.9 IMMUNOSUPPRESSION (H): ICD-10-CM

## 2021-11-04 DIAGNOSIS — N18.6 END-STAGE RENAL DISEASE (H): ICD-10-CM

## 2021-11-04 DIAGNOSIS — Z94.0 HISTORY OF KIDNEY TRANSPLANT: ICD-10-CM

## 2021-11-04 DIAGNOSIS — Z71.84 TRAVEL ADVICE ENCOUNTER: Primary | ICD-10-CM

## 2021-11-04 DIAGNOSIS — Z11.52 ENCOUNTER FOR SCREENING LABORATORY TESTING FOR COVID-19 VIRUS: ICD-10-CM

## 2021-11-04 PROCEDURE — 90472 IMMUNIZATION ADMIN EACH ADD: CPT | Performed by: NURSE PRACTITIONER

## 2021-11-04 PROCEDURE — 99402 PREV MED CNSL INDIV APPRX 30: CPT | Mod: 25 | Performed by: NURSE PRACTITIONER

## 2021-11-04 PROCEDURE — 90691 TYPHOID VACCINE IM: CPT | Performed by: NURSE PRACTITIONER

## 2021-11-04 PROCEDURE — 90734 MENACWYD/MENACWYCRM VACC IM: CPT | Performed by: NURSE PRACTITIONER

## 2021-11-04 PROCEDURE — 90632 HEPA VACCINE ADULT IM: CPT | Performed by: NURSE PRACTITIONER

## 2021-11-04 PROCEDURE — 90471 IMMUNIZATION ADMIN: CPT | Performed by: NURSE PRACTITIONER

## 2021-11-04 RX ORDER — AZITHROMYCIN 500 MG/1
500 TABLET, FILM COATED ORAL DAILY
Qty: 3 TABLET | Refills: 0 | Status: SHIPPED | OUTPATIENT
Start: 2021-11-04 | End: 2021-11-07

## 2021-11-04 RX ORDER — DOXYCYCLINE 100 MG/1
100 CAPSULE ORAL 2 TIMES DAILY
Qty: 100 CAPSULE | Refills: 0 | Status: SHIPPED | OUTPATIENT
Start: 2021-11-04 | End: 2022-07-20

## 2021-11-04 ASSESSMENT — MIFFLIN-ST. JEOR: SCORE: 1568.04

## 2021-11-04 NOTE — PATIENT INSTRUCTIONS
Thank you for visiting the [facility] : [phone]  Today November 4, 2021 you received the    Hepatitis A Vaccine - Please return on 5/3/22 or later for your 2nd and final dose.    Meningococcal (Menactra) Vaccine    Typhoid - injectable. This vaccine is valid for two years.       Follow up vaccine appointments can be made as a NURSE ONLY visit at the Travel Clinic, (BE PREPARED TO WAIT, ) or at designated [facility].    If you are receiving the Rabies vaccines series, it is important that you follow the exact schedule ordered.     Pre-travel     We recommend that you purchase Trip Evacuation Insurance prior to your departure.  Https://wwwnc.cdc.gov/travel/page/insurance    [place] your travel plans with the [place] Department of State through STEP ( Smart Traveler Enrollment Program ) https://step.state.gov.  STEP is a free service to allow [place] citizens and nationals traveling and living abroad to enroll their trip with the nearest [place] Embassy or Consulate.    Animal Exposure: Avoid all mammals even if they look healthy.  If there is a bite, scratch or even a lick, wash area immediately with soap and water for 15 minutes and seek medical care within 24 hours for evaluation of Rabies post exposure treatment.  Contact your Medical Evacuation Insurance.    COVID 19 (Sars Cov2) prevention strategies  Physical distancing: Maintain 6 foot (2m) from others.              Avoid large gatherings and public transportation.   Avoid indoor shopping malls, theaters and restaurants   Practice consistent mask wearing covering the nose, mouth and underneath the chin when unable to maintain 6 foot distance from others.  Hand washing: frequent, thorough handwashing with soap and water for 20 seconds (or using a hand  containing 60% alcohol)   Avoid touching face, nose, eyes, mouth unless you have done appropriate hand washing as above.   Clean high touch surfaces with  approved disinfectant against Covid 19  (70% Ethanol ) or a bleach solution (add 20 mL (4 teaspoons) of bleach to 1 L (1 quart) of water;)  Be careful not to breath or touch bleach.      Travel Covid 19 Testing:  updated 09/22/2021  International travelers: Pre-travel: diagnostic testing (antigen or PCR) may be required for entry:  See country specific Embassy websites or airline websites.    [place] Requirements: All air passengers coming to the [place], including [place] citizens, are required to have a negative COVID-19 test result (within 3 calendar days) even if vaccinated or documentation of recovery from COVID-19 before they board a flight to the [place].    Post travel: CDC recommends getting tested 3-5 days after your trip AND stay home and self-quarantine for 7 days. Even if you test negative, stay home and self-quarantine for the full 7 days. If you don t get tested, stay home and self-quarantine for 10 days.    COVID-19 testing scheduling number for pre-travel through [facility]  [phone] (Must have an order). Available 24 hours a day.  You can also schedule through My Chart.     Post-travel illness:  Contact your provider or [facility] if you develop a fever, rash, cough, diarrhea or other symptoms for up to 1 year after travel.  Inform your healthcare provider when and where you traveled to.    Please call the [employer] [facility] with any questions [phone]  Or send your provider a 'My Chart' note.

## 2021-11-04 NOTE — NURSING NOTE
Prior to immunization administration, verified patients identity using patient s name and date of birth. Please see Immunization Activity for additional information.     Screening Questionnaire for Adult Immunization    Are you sick today?   No   Do you have allergies to medications, food, a vaccine component or latex?   No   Have you ever had a serious reaction after receiving a vaccination?   No   Do you have a long-term health problem with heart, lung, kidney, or metabolic disease (e.g., diabetes), asthma, a blood disorder, no spleen, complement component deficiency, a cochlear implant, or a spinal fluid leak?  Are you on long-term aspirin therapy?   No   Do you have cancer, leukemia, HIV/AIDS, or any other immune system problem?   No   Do you have a parent, brother, or sister with an immune system problem?   No   In the past 3 months, have you taken medications that affect  your immune system, such as prednisone, other steroids, or anticancer drugs; drugs for the treatment of rheumatoid arthritis, Crohn s disease, or psoriasis; or have you had radiation treatments?   No   Have you had a seizure, or a brain or other nervous system problem?   No   During the past year, have you received a transfusion of blood or blood    products, or been given immune (gamma) globulin or antiviral drug?   No   For women: Are you pregnant or is there a chance you could become       pregnant during the next month?   No   Have you received any vaccinations in the past 4 weeks?   No     Immunization questionnaire answers were all negative.        Per orders of IFTIKHAR Miles CNP, injection of Typhim Vi, Menactra, Havrix given by Swathi Rojas MA. Patient instructed to remain in clinic for 15 minutes afterwards, and to report any adverse reaction to me immediately.       Screening performed by Swathi Rojas MA on 11/4/2021 at 6:18 PM.  Swathi Rojas MA on 11/4/2021 at 6:19 PM

## 2021-11-05 ENCOUNTER — APPOINTMENT (OUTPATIENT)
Dept: LAB | Facility: CLINIC | Age: 41
End: 2021-11-05
Payer: COMMERCIAL

## 2021-11-05 LAB — SARS-COV-2 RNA RESP QL NAA+PROBE: NEGATIVE

## 2021-11-05 PROCEDURE — U0005 INFEC AGEN DETEC AMPLI PROBE: HCPCS | Performed by: NURSE PRACTITIONER

## 2021-11-05 PROCEDURE — U0003 INFECTIOUS AGENT DETECTION BY NUCLEIC ACID (DNA OR RNA); SEVERE ACUTE RESPIRATORY SYNDROME CORONAVIRUS 2 (SARS-COV-2) (CORONAVIRUS DISEASE [COVID-19]), AMPLIFIED PROBE TECHNIQUE, MAKING USE OF HIGH THROUGHPUT TECHNOLOGIES AS DESCRIBED BY CMS-2020-01-R: HCPCS | Performed by: NURSE PRACTITIONER
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
## 2021-11-14 ENCOUNTER — HEALTH MAINTENANCE LETTER (OUTPATIENT)
Age: 41
End: 2021-11-14

## 2022-07-20 ENCOUNTER — VIRTUAL VISIT (OUTPATIENT)
Dept: FAMILY MEDICINE | Facility: CLINIC | Age: 42
End: 2022-07-20
Payer: COMMERCIAL

## 2022-07-20 DIAGNOSIS — H60.391 INFECTIVE OTITIS EXTERNA, RIGHT: Primary | ICD-10-CM

## 2022-07-20 DIAGNOSIS — H60.11 CELLULITIS OF RIGHT EXTERNAL EAR: ICD-10-CM

## 2022-07-20 DIAGNOSIS — J01.90 ACUTE NON-RECURRENT SINUSITIS, UNSPECIFIED LOCATION: ICD-10-CM

## 2022-07-20 PROCEDURE — 99213 OFFICE O/P EST LOW 20 MIN: CPT | Mod: GT | Performed by: FAMILY MEDICINE

## 2022-07-20 RX ORDER — NEOMYCIN SULFATE, POLYMYXIN B SULFATE AND HYDROCORTISONE 10; 3.5; 1 MG/ML; MG/ML; [USP'U]/ML
3 SUSPENSION/ DROPS AURICULAR (OTIC) 4 TIMES DAILY
Qty: 6 ML | Refills: 0 | Status: SHIPPED | OUTPATIENT
Start: 2022-07-20 | End: 2022-07-30

## 2022-07-20 NOTE — PROGRESS NOTES
"Yeny is a 42 year old who is being evaluated via a billable video visit.      How would you like to obtain your AVS? MyChart  If the video visit is dropped, the invitation should be resent by: Text to cell phone: [phone]  Will anyone else be joining your video visit? No  {If patient encounters technical issues they should call [phone] :797725}        {PROVIDER CHARTING PREFERENCE:149274}    Subjective   Yeny is a 42 year old, presenting for the following health issues:  URI and Ear Problem (Ear pain and congestion and chills.)      URI         Acute Illness  Acute illness concerns: ***  Onset/Duration: started on Monday.  Symptoms:  Fever: No  Chills/Sweats: YES  Headache (location?): YES on right side of head by ear.  Sinus Pressure: YES  Conjunctivitis:  No  Ear Pain: YES: right  Rhinorrhea: YES  Congestion: YES  Sore Throat: No  Cough: no  Wheeze: No  Decreased Appetite: YES  Nausea: YES  Vomiting: No  Diarrhea: No  Dysuria/Freq.: No  Dysuria or Hematuria: No  Fatigue/Achiness: YES  Sick/Strep Exposure: No  Therapies tried and outcome: None tylenol helps but still has pain  {additonal problems for provider to add (Optional):135843}    Review of Systems   {ROS COMP (Optional):407735}      Objective           Vitals:  No vitals were obtained today due to virtual visit.    Physical Exam   {video visit exam brief selected:741817::\"GENERAL: Healthy, alert and no distress\",\"EYES: Eyes grossly normal to inspection.  No discharge or erythema, or obvious scleral/conjunctival abnormalities.\",\"RESP: No audible wheeze, cough, or visible cyanosis.  No visible retractions or increased work of breathing.  \",\"SKIN: Visible skin clear. No significant rash, abnormal pigmentation or lesions.\",\"NEURO: Cranial nerves grossly intact.  Mentation and speech appropriate for age.\",\"PSYCH: Mentation appears normal, affect normal/bright, judgement and insight intact, normal speech and appearance well-groomed.\"}    {Diagnostic " "Test Results (Optional):531815}    {AMBULATORY ATTESTATION (Optional):610325}        Video-Visit Details    Video Start Time: {video visit start/end time for provider to select:192562}    Type of service:  Video Visit    Video End Time:{video visit start/end time for provider to select:670945}    Originating Location (pt. Location): {video visit patient location:838774::\"Home\"}    Distant Location (provider location):  [facility]     Platform used for Video Visit: {Virtual Visit Platforms:177005::\"Incap\"}    .  ..  "

## 2022-07-20 NOTE — PROGRESS NOTES
Yeny is a 42 year old who is being evaluated via a billable Video visit.  Patient was agreeable to converting the scheduled telephone visit to a video visit.    What phone number would you like to be contacted at? [phone]  How would you like to obtain your AVS? MyChart    Assessment & Plan   See after visit summary for helpful information and advice given to patient.    Infective otitis externa, right    - neomycin-polymyxin-hydrocortisone (CORTISPORIN) 3.5-13504-0 otic suspension  Dispense: 6 mL; Refill: 0    Cellulitis of right external ear    - amoxicillin-clavulanate (AUGMENTIN) 875-125 MG tablet  Dispense: 20 tablet; Refill: 0                 Return in about 3 days (around 7/23/2022).    DO CLINTON Oneill [facility]    Justen Saini is a 42 year old, presenting for the following health issues:  URI and Ear Problem (Ear pain and congestion and chills.)      HPI   Acute illness concerns: URI and ear pain.  Onset/Duration: started on Monday.  Symptoms:  Fever: No  Chills/Sweats: YES  Headache (location?): YES on right side of head by ear.  Sinus Pressure: YES  Conjunctivitis:  No  Ear Pain: YES: right  Rhinorrhea: YES  Congestion: YES  Sore Throat: No  Cough: no  Wheeze: No  Decreased Appetite: YES  Nausea: YES  Vomiting: No  Diarrhea: No  Dysuria/Freq.: No  Dysuria or Hematuria: No  Fatigue/Achiness: YES  Sick/Strep Exposure: No  Therapies tried and outcome: None tylenol helps but still has pain          Review of Systems   Patient is seen by video visit to discuss right ear and right facial area discomfort.    Patient has mild tender edema under right ear auricle, and right ear pain with auricle manipulation. He has itching inside ear canal.  No drainage reported from right ear.  He also has sinus congestion and pressure.      Objective             Physical Exam   Patient is in no acute appearing distress.  Breathing appears nonlabored.  Patient is alert and oriented ×3.   Patient is very pleasant, making good eye contact and responding with clear fluent speech.    Facial exam: Patient has mild generalized edema and slight erythema surrounding right ear, and also just inferior to right ear auricle extending inferiorly for about 2 to 3 cm.  No drainage noted from either ear.                Video call duration: 15 minutes using Apertio.    .  ..

## 2022-11-21 ENCOUNTER — HEALTH MAINTENANCE LETTER (OUTPATIENT)
Age: 42
End: 2022-11-21

## 2023-01-23 ENCOUNTER — ANCILLARY PROCEDURE (OUTPATIENT)
Dept: GENERAL RADIOLOGY | Facility: CLINIC | Age: 43
End: 2023-01-23
Attending: FAMILY MEDICINE
Payer: COMMERCIAL

## 2023-01-23 ENCOUNTER — VIRTUAL VISIT (OUTPATIENT)
Dept: FAMILY MEDICINE | Facility: CLINIC | Age: 43
End: 2023-01-23
Payer: COMMERCIAL

## 2023-01-23 DIAGNOSIS — R05.1 ACUTE COUGH: ICD-10-CM

## 2023-01-23 DIAGNOSIS — R05.1 ACUTE COUGH: Primary | ICD-10-CM

## 2023-01-23 DIAGNOSIS — B96.89 ACUTE BACTERIAL SINUSITIS: ICD-10-CM

## 2023-01-23 DIAGNOSIS — J01.90 ACUTE BACTERIAL SINUSITIS: ICD-10-CM

## 2023-01-23 PROCEDURE — 71046 X-RAY EXAM CHEST 2 VIEWS: CPT | Mod: TC | Performed by: RADIOLOGY

## 2023-01-23 PROCEDURE — 99213 OFFICE O/P EST LOW 20 MIN: CPT | Mod: 95 | Performed by: FAMILY MEDICINE

## 2023-01-23 RX ORDER — AMOXICILLIN 500 MG/1
500 CAPSULE ORAL 3 TIMES DAILY
Qty: 15 CAPSULE | Refills: 0 | Status: SHIPPED | OUTPATIENT
Start: 2023-01-23 | End: 2023-01-28

## 2023-01-23 RX ORDER — FLUTICASONE PROPIONATE 50 MCG
1 SPRAY, SUSPENSION (ML) NASAL DAILY
Qty: 9.9 ML | Refills: 0 | Status: SHIPPED | OUTPATIENT
Start: 2023-01-23 | End: 2023-01-30

## 2023-01-23 RX ORDER — BENZONATATE 100 MG/1
100 CAPSULE ORAL 3 TIMES DAILY PRN
Qty: 20 CAPSULE | Refills: 0 | Status: SHIPPED | OUTPATIENT
Start: 2023-01-23 | End: 2023-03-28

## 2023-01-23 NOTE — PROGRESS NOTES
"Yeny is a 43 year old who is being evaluated via a billable telephone visit.      What phone number would you like to be contacted at? [phone]  How would you like to obtain your AVS? [place]    Distant Location (provider location):  Off-site    Assessment & Plan   Acute bacterial sinusitis  Acute cough  -Symptoms of acute bacterial sinusitis vs pna  -Discussed cxr for further evaluation  -Start with amoxicillin for sinus infection, can add on macrolide if CXR shows pneumonia  -Advised in person appt if worsening symptoms  - fluticasone (FLONASE) 50 MCG/ACT nasal spray  Dispense: 9.9 mL; Refill: 0  - XR Chest 2 Views  - benzonatate (TESSALON) 100 MG capsule  Dispense: 20 capsule; Refill: 0  - amoxicillin (AMOXIL) 500 MG capsule  Dispense: 15 capsule; Refill: 0      DO CLINTON Rogers [facility]    Justen Saini is a 43 year old, presenting for the following health issues:  URI      HPI     Pt with hx of kidney transplant on immunosuppression therapy.  Sick for the last 7 days.   Lots of cough at night. Still having body aches, body aches, nausea.   Was taking acetaminophen.  Having sinus pain pressure, bilateral ear pressure, mild headache. Mild SOB with exertion.    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    Physical Exam   alert and no distress  PSYCH: Alert and oriented times 3; coherent speech, normal   rate and volume, able to articulate logical thoughts, able   to abstract reason, no tangential thoughts, no hallucinations   or delusions  His affect is normal  RESP: No cough, no audible wheezing, able to talk in full sentences  Remainder of exam unable to be completed due to telephone visits        Phone call duration: 10 minutes

## 2023-01-25 ENCOUNTER — APPOINTMENT (OUTPATIENT)
Dept: GENERAL RADIOLOGY | Facility: CLINIC | Age: 43
End: 2023-01-25
Attending: FAMILY MEDICINE
Payer: OTHER MISCELLANEOUS

## 2023-01-25 ENCOUNTER — HOSPITAL ENCOUNTER (EMERGENCY)
Facility: CLINIC | Age: 43
Discharge: HOME | End: 2023-01-25
Attending: FAMILY MEDICINE | Admitting: FAMILY MEDICINE
Payer: OTHER MISCELLANEOUS

## 2023-01-25 ENCOUNTER — APPOINTMENT (OUTPATIENT)
Dept: CT IMAGING | Facility: CLINIC | Age: 43
End: 2023-01-25
Attending: FAMILY MEDICINE
Payer: OTHER MISCELLANEOUS

## 2023-01-25 VITALS
RESPIRATION RATE: 20 BRPM | TEMPERATURE: 97.7 F | HEART RATE: 85 BPM | DIASTOLIC BLOOD PRESSURE: 91 MMHG | OXYGEN SATURATION: 94 % | SYSTOLIC BLOOD PRESSURE: 138 MMHG

## 2023-01-25 DIAGNOSIS — S30.0XXA LUMBAR CONTUSION, INITIAL ENCOUNTER: ICD-10-CM
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
## 2023-01-25 PROCEDURE — 73110 X-RAY EXAM OF WRIST: CPT | Mod: RT

## 2023-01-25 PROCEDURE — 73502 X-RAY EXAM HIP UNI 2-3 VIEWS: CPT

## 2023-01-25 PROCEDURE — 99284 EMERGENCY DEPT VISIT MOD MDM: CPT | Performed by: FAMILY MEDICINE

## 2023-01-25 PROCEDURE — 72131 CT LUMBAR SPINE W/O DYE: CPT

## 2023-01-25 PROCEDURE — 250N000013 HC RX MED GY IP 250 OP 250 PS 637: Performed by: FAMILY MEDICINE

## 2023-01-25 PROCEDURE — 99285 EMERGENCY DEPT VISIT HI MDM: CPT | Mod: 25 | Performed by: FAMILY MEDICINE

## 2023-01-25 RX ORDER — CYCLOBENZAPRINE HCL 10 MG
5-10 TABLET ORAL 3 TIMES DAILY PRN
Qty: 20 TABLET | Refills: 0 | Status: SHIPPED | OUTPATIENT
Start: 2023-01-25 | End: 2023-02-03

## 2023-01-25 RX ORDER — OXYCODONE HYDROCHLORIDE 5 MG/1
5 TABLET ORAL EVERY 6 HOURS PRN
Qty: 10 TABLET | Refills: 0 | Status: SHIPPED | OUTPATIENT
Start: 2023-01-25 | End: 2023-01-28

## 2023-01-25 RX ORDER — OXYCODONE HYDROCHLORIDE 5 MG/1
10 TABLET ORAL ONCE
Status: COMPLETED | OUTPATIENT
Start: 2023-01-25 | End: 2023-01-25

## 2023-01-25 RX ADMIN — OXYCODONE HYDROCHLORIDE 10 MG: 5 TABLET ORAL at 12:30

## 2023-01-25 ASSESSMENT — ACTIVITIES OF DAILY LIVING (ADL)
ADLS_ACUITY_SCORE: 35
ADLS_ACUITY_SCORE: 35

## 2023-01-25 NOTE — DISCHARGE INSTRUCTIONS
Thank you for choosing [facility].     Please closely monitor for further symptoms. Return to the Emergency Department if you develop any new or worsening signs or symptoms.    If you received any opiate pain medications or sedatives during your visit, please do not drive for at least 8 hours.     Labs, cultures or final xray interpretations may still need to be reviewed.  We will call you if your plan of care needs to be changed.    Please follow up with your primary care physician or clinic in 7 to 10 days if your symptoms are not resolving completely.

## 2023-01-25 NOTE — ED NOTES
AVS reviewed with the pt. Medications and work note sent home with the pt. Follow up care and when to return to the emergency room reviewed with the pt. Pt verbalized understanding of plan.

## 2023-01-25 NOTE — LETTER
[facility] EMERGENCY DEPARTMENT  [address]  [place] [address]  [phone]      2023    Yeny Calvin  [address]  [place] [address]  [phone] (home)     : 1980      To Whom it may concern:    Yeny Calvin was seen in our Emergency Department today, 2023 for an injury that was reported to be work related.      For the next 2 days he should not work.  He may then return to work without restrictions if he is feeling better.    The employee might be taking medication so that they cannot operate moving machinery or perform activities that require balancing or working above ground.        The employee might be taking medication so that they cannot operate moving machinery or perform activities that require balancing or working above ground.    Sincerely,    Best Heath MD

## 2023-01-25 NOTE — ED PROVIDER NOTES
"ED Provider Note  [facility]      History     Chief Complaint   Patient presents with     Fall     Pt slipped on ice around 1130 today falling on his back, lower back and down left leg sore, denies hitting head, no LOC     HPI  Yeny CHANEL Nikunj is a 43 year old male who has history of renal transplant in the past who was transferring from 1 building to another at work today when he slipped on the ice, his feet went out from under him, and he landed with a direct blow in the area of his lower back, left sacroiliac joint and hip.  He also threw his right arm back to break his fall.  He has severe pain of the lower back which radiates into the left buttock and down the left leg.  He describes the pain as sharp, and stated that the leg \"feels numb\".  He was also able to ambulate but was limping.  His right wrist is painful.  He denies injuries to his head neck chest abdomen.  States this was clearly a mechanical fall he did not faint.    Past Medical History  Past Medical History:   Diagnosis Date     Kidney failure      S/P kidney transplant      Undescended testes     brought down late, about age 12-14.     Past Surgical History:   Procedure Laterality Date     bilateral orchidopexy  1994     HYPERTENSION MG       ZZC TRANSPLANTATION OF KIDNEY       cyclobenzaprine (FLEXERIL) 10 MG tablet  oxyCODONE (ROXICODONE) 5 MG tablet  amoxicillin (AMOXIL) 500 MG capsule  benzonatate (TESSALON) 100 MG capsule  fluticasone (FLONASE) 50 MCG/ACT nasal spray  lisinopril (PRINIVIL,ZESTRIL) 10 MG tablet  mycophenolic acid (MYFORTIC - GENERIC EQUIVALENT) 180 MG EC tablet  PROGRAF 1 MG PO CAPSULE      Allergies   Allergen Reactions     Latex Other (See Comments)     No reaction listed in [facility]     Family History  Family History   Problem Relation Age of Onset     Unknown/Adopted Mother      Unknown/Adopted Father      Social History   Social History     Tobacco Use     Smoking status: Never     Smokeless " tobacco: Never   Substance Use Topics     Alcohol use: No     Alcohol/week: 0.0 standard drinks     Drug use: No      Past medical history, past surgical history, medications, allergies, family history, and social history were reviewed with the patient. No additional pertinent items.      A complete review of systems was performed with pertinent positives and negatives noted in the HPI, and all other systems negative.    Physical Exam   BP: (!) 138/91  Pulse: 85  Temp: 97.7  F (36.5  C)  Resp: 20  SpO2: 94 %  Physical Exam  Vitals and nursing note reviewed.   Constitutional:       General: He is in acute distress (In obvious discomfort).   HENT:      Head: Normocephalic and atraumatic.      Nose: Nose normal.   Eyes:      Pupils: Pupils are equal, round, and reactive to light.   Cardiovascular:      Rate and Rhythm: Normal rate.   Pulmonary:      Effort: Pulmonary effort is normal.   Chest:      Chest wall: No tenderness.   Abdominal:      Tenderness: There is no abdominal tenderness.   Musculoskeletal:         General: Tenderness and signs of injury present.      Cervical back: Neck supple. No tenderness.      Lumbar back: Spasms and tenderness present.        Back:       Left hip: Tenderness and bony tenderness (He is tender directly over the greater trochanter.  There is no obvious deformity.  No leg length discrepancy) present.      Comments: Sensation and strength are intact in lower extremity leg appears well perfused   Skin:     General: Skin is warm and dry.   Neurological:      General: No focal deficit present.      Mental Status: He is oriented to person, place, and time.      Sensory: No sensory deficit.      Motor: No weakness.           ED Course, Procedures, & Data      Procedures                     Results for orders placed or performed during the hospital encounter of 01/25/23   Lumbar spine CT w/o contrast     Status: None    Narrative    CT LUMBAR SPINE WITHOUT CONTRAST  1/25/2023 1:13  PM    INDICATION: Low back and left leg pain after fall.    TECHNIQUE: CT scan of the lumbar spine without contrast. Dose  reduction techniques were used.    COMPARISON: None.    FINDINGS: Both kidneys appear atrophic. Five lumbar vertebral bodies  demonstrate normal height and alignment. No compression deformity or  acute fracture. Mild disc degeneration at L5-S1 where there is a  shallow disc bulge that results in mild lateral recess and foraminal  narrowing.      Impression    IMPRESSION:  1.  No acute lumbar spine fracture.  2.  Mild degenerative changes at L5-S1.  3.  Both kidneys appear atrophic. Correlate with renal function.    GLADYS GREEN MD         SYSTEM ID:  [id number]   XR Hip Left 2-3 Views     Status: None    Narrative    HIP TWO VIEWS LEFT 1/25/2023 1:03 PM     HISTORY: Trauma, pain  COMPARISON: None.    FINDINGS: There is no significant degenerative change.  There is no  acute fracture. No dislocation.  There are no worrisome bony lesions.      Impression    IMPRESSION: No acute osseous abnormality demonstrated.    DIEGO NORTON MD         SYSTEM ID:  [id number]   XR Wrist Right G/E 3 Views     Status: None    Narrative    XR WRIST RIGHT G/E 3 VIEWS  1/25/2023 1:03 PM     HISTORY: Trauma, pain  COMPARISON: None      Impression    IMPRESSION: No acute fracture or malalignment. There is normal joint  spacing.     DIEGO NORTON MD         SYSTEM ID:  [id number]     Medications   oxyCODONE (ROXICODONE) tablet 10 mg (10 mg Oral Given 1/25/23 1230)     Labs Ordered and Resulted from Time of ED Arrival to Time of ED Departure - No data to display  Lumbar spine CT w/o contrast   Final Result   IMPRESSION:   1.  No acute lumbar spine fracture.   2.  Mild degenerative changes at L5-S1.   3.  Both kidneys appear atrophic. Correlate with renal function.      GLADYS GREEN MD            SYSTEM ID:  [id number]      XR Hip Left 2-3 Views   Final Result   IMPRESSION: No acute osseous abnormality  demonstrated.      DIEGO NORTON MD            SYSTEM ID:  [id number]      XR Wrist Right G/E 3 Views   Final Result   IMPRESSION: No acute fracture or malalignment. There is normal joint   spacing.       DIEGO NORTON MD            SYSTEM ID:  [id number]             Medical Decision Making  The patient's presentation is strongly suggestive of an acute complicated injury.    The patient's evaluation involved:  ordering and/or review of 3+ test(s) in this encounter (CT lumbar spine, plain film of hip and pelvis, plain film of wrist)    The patient's management involved prescription drug management (including medications given in the ED) and drug therapy requiring intensive monitoring (Oxycodone).      Assessment & Plan    43-year-old man with a history of prior renal transplant in the remote past, who suffered a mechanical fall with a direct blow to his left sacroiliac, lumbar spine, left hip and right wrist.  No apparent injury to his head neck chest or abdomen.  Exam reveals significant tenderness in the aforementioned areas without deformity, break in the skin, ecchymosis.  No apparent neurovascular compromise of his lower extremity.  He was treated with oxycodone for pain with substantial improvement in his symptoms.  He is able to ambulate without difficulty now.  His imaging shows evidence of lumbar disc disease but no acute fracture, plain films of the hip no fracture pain from the wrist no fracture.  Patient appears to be suffering from muscle contusion and strain with some symptoms of sciatica related to a mechanical fall.  We will plan to treat with symptomatically and have him follow-up with his clinic if his symptoms are not resolving as expected.  We discussed the indications for emergency department return and follow-up.  Stable for discharge.      I have reviewed the nursing notes. I have reviewed the findings, diagnosis, plan and need for follow up with the patient.    Discharge Medication  List as of 1/25/2023  3:32 PM      START taking these medications    Details   cyclobenzaprine (FLEXERIL) 10 MG tablet Take 0.5-1 tablets (5-10 mg) by mouth 3 times daily as needed for muscle spasms, Disp-20 tablet, R-0, Local Print      oxyCODONE (ROXICODONE) 5 MG tablet Take 1 tablet (5 mg) by mouth every 6 hours as needed for pain, Disp-10 tablet, R-0, Local Print             Final diagnoses:   Fall, initial encounter   Lumbar contusion, initial encounter       Best Heath MD  [facility] EMERGENCY DEPARTMENT  1/25/2023     Best Heath MD  01/25/23 1611       Best Heath MD  01/25/23 1611

## 2023-02-03 ENCOUNTER — OFFICE VISIT (OUTPATIENT)
Dept: FAMILY MEDICINE | Facility: CLINIC | Age: 43
End: 2023-02-03
Payer: OTHER MISCELLANEOUS

## 2023-02-03 VITALS
SYSTOLIC BLOOD PRESSURE: 145 MMHG | HEIGHT: 70 IN | DIASTOLIC BLOOD PRESSURE: 92 MMHG | TEMPERATURE: 98 F | HEART RATE: 69 BPM | WEIGHT: 148 LBS | OXYGEN SATURATION: 100 % | RESPIRATION RATE: 22 BRPM | BODY MASS INDEX: 21.19 KG/M2

## 2023-02-03 DIAGNOSIS — Z94.0 KIDNEY REPLACED BY TRANSPLANT: ICD-10-CM

## 2023-02-03 DIAGNOSIS — M54.16 LUMBAR RADICULOPATHY: Primary | ICD-10-CM

## 2023-02-03 PROCEDURE — 99213 OFFICE O/P EST LOW 20 MIN: CPT | Performed by: INTERNAL MEDICINE

## 2023-02-03 RX ORDER — OXYCODONE HYDROCHLORIDE 10 MG/1
5 TABLET ORAL EVERY 4 HOURS PRN
COMMUNITY
End: 2023-03-28

## 2023-02-03 RX ORDER — CYCLOBENZAPRINE HCL 10 MG
5-10 TABLET ORAL 3 TIMES DAILY PRN
Qty: 20 TABLET | Refills: 0 | Status: SHIPPED | OUTPATIENT
Start: 2023-02-03 | End: 2023-05-08

## 2023-02-03 RX ORDER — LIDOCAINE 50 MG/G
2 PATCH TOPICAL EVERY 24 HOURS
Qty: 60 PATCH | Refills: 0 | Status: SHIPPED | OUTPATIENT
Start: 2023-02-03

## 2023-02-03 ASSESSMENT — PAIN SCALES - GENERAL: PAINLEVEL: SEVERE PAIN (7)

## 2023-02-03 NOTE — LETTER
February 3, 2023      RE: Yeny Calvin  [address]  [place] [address]        To whom it may concern:    Yeny Calvin is under my professional care for    Lumbar radiculopathy  Kidney replaced by transplant     He was not able to work due to the back pain on 2/2/2023 and 2/3/2023. He  may return to work with the following: The employee is UNABLE to return to work until 2/8/2023    When the patient returns to work, the following restrictions apply until 2/22/2023:   -- Avoid prolonged sitting. Do in person appointments only.       Sincerely,        Mia Briceño MD PhD

## 2023-02-03 NOTE — PROGRESS NOTES
"  {PROVIDER CHARTING PREFERENCE:439364}    Justen Saini is a 43 year old, presenting for the following health issues:  No chief complaint on file.      HPI     Patient slipped and fell on ice on 1/25. He states he contiunes to have lower back pain and soreness down his left leg. Patient states he feel like he cannot sit for more than 10 minutes without being in extreme pain.     Patient rates his pain 7/10 today.     Patient is taking oxycodone and flexeril for pain but states he still has a lot of breakthrough pain, especially when sitting.       Hospital Follow-up Visit:    Hospital/Nursing Home/IP Rehab Facility: [facility]  Date of Admission: 1/25/23  Date of Discharge: 1/25/23  Reason(s) for Admission: Fall    Was your hospitalization related to COVID-19? No   Problems taking medications regularly:  Yes  Medication changes since discharge: None  Problems adhering to non-medication therapy:  None    Summary of hospitalization:  {HOSPITAL DISCHARGE SUMMARY INFO:457690::\"[facility] hospital discharge summary reviewed\"}  Diagnostic Tests/Treatments reviewed.  Follow up needed: {NONE DEFAULTED:241878::\"none\"}  Other Healthcare Providers Involved in Patient s Care:         {those currently involved after discharge:255211::\"None\"}  Update since discharge: {IMPROVED DEFAULT:842535::\"improved.\"} {TIP  Include information from family/caregivers, SNF, Care Coordination :615524}        Plan of care communicated with patient         Review of Systems   {ROS COMP (Optional):477757}      Objective    There were no vitals taken for this visit.  There is no height or weight on file to calculate BMI.  Physical Exam   {Exam List (Optional):408024}    {Diagnostic Test Results (Optional):964769}    {AMBULATORY ATTESTATION (Optional):360047}            "

## 2023-02-03 NOTE — LETTER
February 3, 2023      Yeny Calvin  [address]  [facility] [address]              To whom it may concern:    Yeny Calvin is under my professional care for Lumbar radiculopathy    He was not able to work due to the back pain on 2/2/2023 and 2/3/2023. He  may return to work with the following: The employee is UNABLE to return to work until 2/8/2023    When the patient returns to work, the following restrictions apply until 2/22/2023:   -- Avoid prolonged sitting. Do in person appointments only.       Sincerely,        Mia Briceño MD PhD

## 2023-02-03 NOTE — LETTER
February 3, 2023      RE: Yeny Calvin  [address]  [place] [address]        To whom it may concern:    Yeny Calvin is under my professional care for    Lumbar radiculopathy  Kidney replaced by transplant He  may return to work with the following: The employee is UNABLE to return to work until 2/8/2023    When the patient returns to work, the following restrictions apply until 2/22/2023:  -- Avoid prolonged sitting. Do in person appointments only.     Should you have any questions, please feel free to contact me at the address above.        Sincerely,        Mia Briceño MD PhD

## 2023-02-03 NOTE — PROGRESS NOTES
Assessment & Plan     Yeny was seen today for \[facility] [place] [facility]\"" f/.    Diagnoses and all orders for this visit:    Lumbar radiculopathy  -     Physical Therapy Referral; Future  -     lidocaine (LIDODERM) 5 % patch; Place 2 patches onto the skin every 24 hours To prevent lidocaine toxicity, patient should be patch free for 12 hrs daily.  -     diclofenac (VOLTAREN) 1 % topical gel; Apply 2-4 g topically 4 times daily as needed for moderate pain (4-6)  -     cyclobenzaprine (FLEXERIL) 10 MG tablet; Take 0.5-1 tablets (5-10 mg) by mouth 3 times daily as needed for muscle spasms    Kidney replaced by transplant  Comments:  unable to use systemic NSAID    I recommended resting in the next few days and do In person visits only for 2 weeks. Start physical therapy. Topical diclofenac gel and/or lidocaine patch, whichever is effective. Muscle relaxer.       Return in about 2 weeks (around 2/17/2023) for Clinic follow up with PCP..    Mia Briceño MD PhD  [facility]    Justen Saini is a 43 year old, presenting for the following health issues:  Hospital F/U      HPI     Patient slipped and fell on ice on 1/25. He states he contiunes to have lower back pain and soreness down his left leg. Patient states he feel like he cannot sit for more than 10 minutes without being in extreme pain.     Patient rates his pain 7/10 today.     Patient is taking oxycodone and flexeril for pain but states he still has a lot of breakthrough pain, especially when sitting.     , virtual 2 weeks and in person 2 weeks. During virtual appoints, he is confined to a small cubical and sitting 8 hours a day. He went back to work and had exacerbated the back pain. Went to work on 2/1/2023, unable to finish his day.       Hospital Follow-up Visit:    Hospital/Nursing Home/IP Rehab Facility: [facility]  Date of Admission: 1/25/23  Date of Discharge: 1/25/23  Reason(s)  "for Admission: Fall    Was your hospitalization related to COVID-19? No   Problems taking medications regularly:  Yes  Medication changes since discharge: None  Problems adhering to non-medication therapy:  None    Summary of hospitalization:  [facility] discharge summary reviewed  Diagnostic Tests/Treatments reviewed.  Follow up needed: none  Other Healthcare Providers Involved in Patient s Care:         None  Update since discharge: worsened.  fluctuating course.     Plan of care communicated with patient     He was given oxycodone prescription but he didn't fill it. He feels dizzy and nausea when he takes this. Has history of kidney transplant.     Review of Systems   Constitutional, HEENT, cardiovascular, pulmonary, gi and gu systems are negative, except as otherwise noted.      Objective    BP (!) 145/92 (BP Location: Right arm)   Pulse 69   Temp 98  F (36.7  C) (Oral)   Resp 22   Ht 1.778 m (5' 10\")   Wt 67.1 kg (148 lb)   SpO2 100%   BMI 21.24 kg/m    Body mass index is 21.24 kg/m .  Physical Exam   MS: back diffuse tenderness across the low back on the left side, paravertebral, buttock.     Recent Results (from the past 744 hour(s))   XR Chest 2 Views    Narrative    CHEST TWO VIEWS  1/23/2023 1:46 PM     HISTORY: 43-year-old with acute cough.    COMPARISON: None       Impression    IMPRESSION: Normal.    DIEGO BENSON MD         SYSTEM ID:  [id number]   XR Wrist Right G/E 3 Views    Narrative    XR WRIST RIGHT G/E 3 VIEWS  1/25/2023 1:03 PM     HISTORY: Trauma, pain  COMPARISON: None      Impression    IMPRESSION: No acute fracture or malalignment. There is normal joint  spacing.     DIEGO NORTON MD         SYSTEM ID:  [id number]   XR Hip Left 2-3 Views    Narrative    HIP TWO VIEWS LEFT 1/25/2023 1:03 PM     HISTORY: Trauma, pain  COMPARISON: None.    FINDINGS: There is no significant degenerative change.  There is no  acute fracture. No dislocation.  There are no worrisome bony " lesions.      Impression    IMPRESSION: No acute osseous abnormality demonstrated.    DIEGO NORTON MD         SYSTEM ID:  [id number]   Lumbar spine CT w/o contrast    Narrative    CT LUMBAR SPINE WITHOUT CONTRAST  1/25/2023 1:13 PM    INDICATION: Low back and left leg pain after fall.    TECHNIQUE: CT scan of the lumbar spine without contrast. Dose  reduction techniques were used.    COMPARISON: None.    FINDINGS: Both kidneys appear atrophic. Five lumbar vertebral bodies  demonstrate normal height and alignment. No compression deformity or  acute fracture. Mild disc degeneration at L5-S1 where there is a  shallow disc bulge that results in mild lateral recess and foraminal  narrowing.      Impression    IMPRESSION:  1.  No acute lumbar spine fracture.  2.  Mild degenerative changes at L5-S1.  3.  Both kidneys appear atrophic. Correlate with renal function.    GLADYS GREEN MD         SYSTEM ID:  [id number]

## 2023-02-07 ENCOUNTER — THERAPY VISIT (OUTPATIENT)
Dept: PHYSICAL THERAPY | Facility: CLINIC | Age: 43
End: 2023-02-07
Attending: INTERNAL MEDICINE
Payer: OTHER MISCELLANEOUS

## 2023-02-07 DIAGNOSIS — M54.16 LUMBAR RADICULOPATHY: ICD-10-CM

## 2023-02-07 PROCEDURE — 97161 PT EVAL LOW COMPLEX 20 MIN: CPT | Mod: GP

## 2023-02-07 PROCEDURE — 97110 THERAPEUTIC EXERCISES: CPT | Mod: GP

## 2023-02-07 NOTE — PROGRESS NOTES
Physical Therapy Initial Examination/Evaluation  February 7, 2023    Key PT Findings:   1) Significantly positive SLR on L   2) Increased radicular symptoms down L leg into foot  3) Left sided weakness present in LE     Yeny Calvin is a 43 year old male referred to physical therapy by Dr. Mia Briceño MD for treatment of lumbar pain with Precautions/Restrictions/MD instructions E&T    Therapist Impression: Yeny Calvin presents to therapy with low back and leg pain. Currently demonstrates impairments with lumbar range of motion, hip range of motion, tra activation, and positive SLR/slump. Due to these impairments, Yeny Calvin is unable to sit >10 minutes, stand for extended period of time, walk >15 minutes and sleep without issue.      Subjective:  Presenting Complaint Low back and leg pain   Mechanism of Injury  Slip and fall on ice   DOI (onset)/ DOS 1/25/23   Functional Limitations Sitting, standing and walking   Notable PMH See Epic chart    Prior treatment Nothing  good   Prior Imaging  CT -   IMPRESSION:  1.  No acute lumbar spine fracture.  2.  Mild degenerative changes at L5-S1.  3.  Both kidneys appear atrophic. Correlate with renal function.       Pain/Presentation    Pain Level   Rest: 2/10  ; Activity: 6/10   Location   left low back and leg leg to toe   Frequency Intermittent   Described as aching, dull, sharp, shooting and tingling    Alleviated by  Nothing   Progression of Sx Unchanged   Time of Day  Evening, Activity related and Position related   Sleeping  Interrupted due to current issue       Social Factors/Lifestyle  Occupation and Duties    prolonged sitting, prolonged standing   Barriers at home/work None as reported by patient   Medications See epic   Current HEP/Exercise Walking; enjoys lifting weights   Patient Reported Health good     Patient Goals:  1) standing and sitting for longer periods  2) back to lifting weights      Other factors/PMH that may impact care:  no previous history of LBP      HPI                    Lumbar Spine Evaluation    Dermatome Testing: Negative    Myotome Testing:  Positive     Motor Deficit:  Myotomes L R   L1-2 (hip flexion) 3+/5 5/5   L3 (knee extension) 3+/5 5/5   L4 (ankle DF) 5/5 5/5   L5 (g. toe ext) 5/5 5/5   S1 (ankle PF or knee flex) 5/5 5/5      Sensory Deficit, Reflexes: WNL ; notes numbness singling down left leg into lateral toes     Hip Joint Screen Positive on left due to nerve symptoms     Trunk Range of Motion  Movement Loss Major Moderate Minimal Nil Pain   Flexion x    x   Extension  x   x   Sidebending R   x     Sidebending L   x     Side Gliding R    x    Side Gliding L    x      Directional Preference: extension      Flexibility Quadriceps Hamstrings Ankle Figure 4   Left severe severe none/WNL moderate   Right none/WNL none/WNL none/WNL none/WNL     Trunk Strength  Poor abdominal recruitment; requires significant cueing for proper downward activation of TrA     Hip and Knee Strength   MMT Hip Abduction Hip Extension Hip ER Knee Flexion   Left 4/5 4/5 4/5 4/5   Right 5/5 5/5 5/5 5/5     Neural Tension:   Slump: +   SLR: + on L at 20 degrees     Special Tests  Spring testing: Positive L2-4   Neural tension: Positive    Palpation:  Moderate tenderness to palpation at left paraspinals; [facility]'s lumbar spine        System    Physical Exam    General     ROS    Assessment/Plan:    Patient is a 43 year old male with lumbar complaints.    Patient has the following significant findings with corresponding treatment plan.                Diagnosis 1:  Lumbar radiculopathy  Pain -  hot/cold therapy, electric stimulation, mechanical traction and manual therapy  Decreased ROM/flexibility - manual therapy and therapeutic exercise  Decreased joint mobility - manual therapy and therapeutic exercise  Decreased strength - therapeutic exercise and therapeutic activities  Impaired gait - gait training  Impaired muscle performance - neuro  re-education  Decreased function - therapeutic activities  Impaired posture - neuro re-education    Therapy Evaluation Codes:     1) Clinical presentation characteristics are:   Stable/Uncomplicated.  2) Decision-Making    Low complexity using standardized patient assessment instrument and/or measureable assessment of functional outcome.  Cumulative Therapy Evaluation is: Low complexity.    Previous and current functional limitations:  (See Goal Flow Sheet for this information)    Short term and Long term goals: (See Goal Flow Sheet for this information)     Communication ability:  Patient appears to be able to clearly communicate and understand verbal and written communication and follow directions correctly.  Treatment Explanation - The following has been discussed with the patient:   RX ordered/plan of care  Anticipated outcomes  Possible risks and side effects  This patient would benefit from PT intervention to resume normal activities.   Rehab potential is good.    Frequency:  1 X week, once daily for 4 weeks then every other week for 8 weeks  Duration:  for 12 weeks  Discharge Plan:  Achieve all LTG.  Independent in home treatment program.  Reach maximal therapeutic benefit.    Please refer to the daily flowsheet for treatment today, total treatment time and time spent performing 1:1 timed codes.

## 2023-02-14 ENCOUNTER — THERAPY VISIT (OUTPATIENT)
Dept: PHYSICAL THERAPY | Facility: CLINIC | Age: 43
End: 2023-02-14
Attending: INTERNAL MEDICINE
Payer: OTHER MISCELLANEOUS

## 2023-02-14 DIAGNOSIS — M54.16 LUMBAR RADICULOPATHY: Primary | ICD-10-CM

## 2023-02-14 PROCEDURE — 97140 MANUAL THERAPY 1/> REGIONS: CPT | Mod: 59

## 2023-02-14 PROCEDURE — 97530 THERAPEUTIC ACTIVITIES: CPT | Mod: GP

## 2023-02-14 PROCEDURE — 97110 THERAPEUTIC EXERCISES: CPT | Mod: 59

## 2023-02-20 ENCOUNTER — OFFICE VISIT (OUTPATIENT)
Dept: FAMILY MEDICINE | Facility: CLINIC | Age: 43
End: 2023-02-20
Payer: OTHER MISCELLANEOUS

## 2023-02-20 VITALS
HEART RATE: 82 BPM | SYSTOLIC BLOOD PRESSURE: 152 MMHG | TEMPERATURE: 98 F | WEIGHT: 144.8 LBS | OXYGEN SATURATION: 97 % | HEIGHT: 69 IN | RESPIRATION RATE: 18 BRPM | DIASTOLIC BLOOD PRESSURE: 88 MMHG | BODY MASS INDEX: 21.45 KG/M2

## 2023-02-20 DIAGNOSIS — M54.16 LUMBAR RADICULOPATHY: ICD-10-CM
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
## 2023-02-20 PROCEDURE — 99214 OFFICE O/P EST MOD 30 MIN: CPT | Performed by: FAMILY MEDICINE

## 2023-02-20 ASSESSMENT — PAIN SCALES - GENERAL: PAINLEVEL: MODERATE PAIN (5)

## 2023-02-20 NOTE — LETTER
February 20, 2023    Yeny Calvin  [address]  [place] [address]              Work: work restrictions as following    Yeny Calvin was seen 02/20/23 at [facility].  Date of injury 1/25/23.    Please extended the following restrictions -  until 3/22/2023:   -- Avoid prolonged sitting. Do in person appointments only.       Sincerely,      Best Regards,    Susu Youngblood MD  [facility]  [phone]

## 2023-02-20 NOTE — PROGRESS NOTES
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    Fall, sequela  Plan: continue with PHYSICAL THERAPY  DOI-1/25/23.    Lumbar radiculopathy  Plan: proceed with orthopedic consultation  Work related restriction given for next month  Left sciatica- improves if avoid prolonged sitting as .  Ok to take lidocaine patch as needed .  Muscle relaxant as needed     Slightly elevated Blood pressure- possibly because of pain  Recheck in follow up       I spent a total of 30 minutes on the day of the visit.   Time spent doing chart review, history and exam, documentation and further activities per the note      Return in about 4 weeks (around 3/20/2023) for BP Recheck/ HTN, virtual/video visit, routine physical.   Follow-up Visit   Expected date:  Feb 27, 2023 (Approximate)      Follow Up Appointment Details:     Follow-up with whom?: Any Primary Care Provider    Follow-Up for what?: Adult Preventive    Any Additional Chronic Condition Management?: General (Other)    How?: In Person              Follow-up Visit   Expected date:  Mar 06, 2023 (Approximate)      Follow Up Appointment Details:     Follow-up with whom?: Any Primary Care Provider    Follow-Up for what?: Adult Preventive    How?: In Person                    Susu Youngblood MD  [facility] HealthcareNICOL Saini is a 43 year old{presenting for the following health issues:  Work Comp      HPI   Patient of [employer]o [employer]ader  Workmen compensation visit.  [place] [facility] [place]I:1/25/2023  Fell on ice- while walking between the two buildings  Seen in emergency department-1/25/23  CT lumbar spine-negative for acute fracture.  Mild degenerative joint disease L5S1  Both atrophic kidneys-  Xray lumbar spine was negative as well   FP- follow up on 02/02/23-was advised PHYSICAL THERAPY & given lidocaine patches and flexeil.  Has been to  "PHYSICAL THERAPY twice on 02/7 & 02/14  & has a follow up appointment today.    Over a period of time- pain has improved- and it helps him to be able to stand and sit at work instead of prolonged sitting that seem to aggravate the pain & needs to have this restriction to avoid prolonged sitting extended - till he continue to improve with PHYSICAL THERAPY.  He reports that the left lumbar pain radiating beyond the knee- on and off.  Intact sensation/bowel & bladder control.  Able to walk- it helps.  Can not take NSAID  And lidocaine patch and flexeril as needed  Help.              Review of Systems   Constitutional, HEENT, cardiovascular, pulmonary, GI, , musculoskeletal, neuro, skin, endocrine and psych systems are negative, except as otherwise noted.      Objective    BP (!) 152/88 (BP Location: Left arm, Patient Position: Sitting, Cuff Size: Adult Regular)   Pulse 82   Temp 98  F (36.7  C) (Temporal)   Resp 18   Ht 1.753 m (5' 9\")   Wt 65.7 kg (144 lb 12.8 oz)   SpO2 97%   BMI 21.38 kg/m    Body mass index is 21.38 kg/m .  Physical Exam   GENERAL: healthy, alert and no distress  SLR positive bilateral.  Intact sensation and strength 5/5.  Intact reflexes   RESP: lungs clear to auscultation - no rales, rhonchi or wheezes  CV: regular rate and rhythm, normal S1 S2, no S3 or S4, no murmur, click or rub, no peripheral edema and peripheral pulses strong  ABDOMEN: soft, nontender, no hepatosplenomegaly, no masses and bowel sounds normal                "

## 2023-03-03 ENCOUNTER — TELEPHONE (OUTPATIENT)
Dept: FAMILY MEDICINE | Facility: CLINIC | Age: 43
End: 2023-03-03
Payer: COMMERCIAL

## 2023-03-03 NOTE — TELEPHONE ENCOUNTER
A.S,  Patient calling to ask about updating work restrictions letter.  States his employer would like him to be able to do virtual video visits as well.  Do you want a follow-up to discuss this?  Can call patient at [phone].  Reyna ROLLINS RN

## 2023-03-06 NOTE — TELEPHONE ENCOUNTER
Patient  is scheduled for a Video visit tomorrow with Dr Rylie Berger [facility] Unit Coordinator

## 2023-03-07 ENCOUNTER — VIRTUAL VISIT (OUTPATIENT)
Dept: FAMILY MEDICINE | Facility: CLINIC | Age: 43
End: 2023-03-07
Payer: OTHER MISCELLANEOUS
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
## 2023-03-07 PROCEDURE — 99213 OFFICE O/P EST LOW 20 MIN: CPT | Mod: 95 | Performed by: FAMILY MEDICINE

## 2023-03-07 NOTE — PROGRESS NOTES
Yeny is a 43 year old who is being evaluated via a billable video visit.      How would you like to obtain your AVS? MyChart  If the video visit is dropped, the invitation should be resent by: Text to cell phone: [phone]  Will anyone else be joining your video visit? No      Appointment was changed from virtual video visit to telephone only visit.    Assessment & Plan     Falls, sequela  Plan: initial we discussed to extend the restrictions as patient reported that he is not always provided with sit and stand desk.  03/14/23 - the note and letter is addend because it appears that the employer is able to provide sit and stand desk .  The new letter is in place.    Susu Youngblood MD  [facility]    Subjective   Yeny is a 43 year old , presenting for the following health issues:  Letter for School/Work (1/25/23 WC back pain. Update work restrictions)      HPI     WC Back pain 1/25/23  It is getting better, he would like to update his work restrictions  He reports he is unable to sit for long time.  She is not consistently seen since 10 days.  He would like to have the work restriction extended.        Review of Systems   Constitutional, HEENT, cardiovascular, pulmonary, GI, , musculoskeletal, neuro, skin, endocrine and psych systems are negative, except as otherwise noted.      Objective             Physical Exam   No exam due to TE appointment     Addendum on the note completed 03/14/23  Total time on telephone is 5 minutes.

## 2023-03-07 NOTE — LETTER
03/07/23      Yeny Calvin  [address]  [place] [facility] [address]              Work: work restrictions as following  Date of injury 1/25/23.    Yeny Calvin  Follow up on 03/07/23 with me .      Avoid prolong sitting at work and avoid video visit.  Ok to compete in per appointment at work.  Ok to continue with these restrictions till 04/07/23      Sincerely,      Best Regards,    Susu Youngblood MD  [facility]  [phone]

## 2023-03-07 NOTE — LETTER
FOR: Work         RE: Yeny Calvin  : 23            Yeny Calvin does not need further work restriction- as long as he is provided with sit/stand desk and ability to be able to stand as needed while performing his duties virtually as .             Sincerely,      Susu Youngblood MD

## 2023-03-09 ENCOUNTER — TELEPHONE (OUTPATIENT)
Dept: FAMILY MEDICINE | Facility: CLINIC | Age: 43
End: 2023-03-09
Payer: COMMERCIAL

## 2023-03-14 ENCOUNTER — TELEPHONE (OUTPATIENT)
Dept: FAMILY MEDICINE | Facility: CLINIC | Age: 43
End: 2023-03-14
Payer: COMMERCIAL

## 2023-03-14 NOTE — TELEPHONE ENCOUNTER
GLORIA Lindsey from [facility] called.  Did call on 3/9 but looks like encounter was changed to erroneous    Calling about work comp.  Need to know why patient need to have restrictions  Note: "[employer] health has the right to call and ask about patient's work comp.    Patient info:  Does not like to do virtual visits, states they restrict him from  being able to move around, wants to be able to make positional changes  Only wants to do in-person visits.    Manager states patient has sit/stand desk, can get up and move around as needed  Is not restricted to just sit a a desk    Patient has verbalizes that he just doesn't like doing virtual visits in general per clinic team.    Need to know why patient needs restrictions.  Patient is an , staffing is low    Rosalia: [phone]: can give verbal reason    Thank you,  Shari Garza, RN

## 2023-03-15 NOTE — TELEPHONE ENCOUNTER
Susu Youngblood MD  [facility] Triage  Caller: Unspecified (Yesterday,  3:45 PM)  Hello, I spoke with patient.  Today he clarified that not always he gets sit and stand desk at the facility.  He is willing to go back to work unrestricted as long as he is provided with sit and stand desk at work.  There is a new letter that I have addended.  Hope that helps.   Thanks     Detailed VM left for Rosalia.    Shari Garza RN

## 2023-03-21 ENCOUNTER — THERAPY VISIT (OUTPATIENT)
Dept: PHYSICAL THERAPY | Facility: CLINIC | Age: 43
End: 2023-03-21
Payer: OTHER MISCELLANEOUS

## 2023-03-21 DIAGNOSIS — M54.16 LUMBAR RADICULOPATHY: Primary | ICD-10-CM

## 2023-03-21 PROCEDURE — 97530 THERAPEUTIC ACTIVITIES: CPT | Mod: GP

## 2023-03-21 PROCEDURE — 97140 MANUAL THERAPY 1/> REGIONS: CPT | Mod: GP

## 2023-03-21 PROCEDURE — 97110 THERAPEUTIC EXERCISES: CPT | Mod: GP

## 2023-03-28 ENCOUNTER — OFFICE VISIT (OUTPATIENT)
Dept: PHYSICAL MEDICINE AND REHAB | Facility: CLINIC | Age: 43
End: 2023-03-28
Attending: FAMILY MEDICINE
Payer: OTHER MISCELLANEOUS

## 2023-03-28 VITALS
SYSTOLIC BLOOD PRESSURE: 148 MMHG | WEIGHT: 145 LBS | OXYGEN SATURATION: 100 % | DIASTOLIC BLOOD PRESSURE: 88 MMHG | BODY MASS INDEX: 20.76 KG/M2 | HEIGHT: 70 IN

## 2023-03-28 DIAGNOSIS — M54.42 CHRONIC LEFT-SIDED LOW BACK PAIN WITH LEFT-SIDED SCIATICA: ICD-10-CM
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
## 2023-03-28 DIAGNOSIS — G89.29 CHRONIC LEFT-SIDED LOW BACK PAIN WITH LEFT-SIDED SCIATICA: ICD-10-CM

## 2023-03-28 DIAGNOSIS — M54.16 LEFT LUMBAR RADICULOPATHY: Primary | ICD-10-CM

## 2023-03-28 DIAGNOSIS — M51.369 BULGING LUMBAR DISC: ICD-10-CM

## 2023-03-28 PROCEDURE — 99204 OFFICE O/P NEW MOD 45 MIN: CPT | Performed by: NURSE PRACTITIONER

## 2023-03-28 ASSESSMENT — PAIN SCALES - GENERAL: PAINLEVEL: MILD PAIN (3)

## 2023-03-28 NOTE — LETTER
March 28, 2023      Yeny Calvin  [address]  [place] [facility] [address]        To Whom It May Concern:    Yeny Calvin was seen in our clinic. He may return to work with the following: Allow for transition from sit to stand every 1 hour as needed, bending and lifting as tolerated.  Restrictions in place through May 28, 2023, will revisit further workability needs at that time.      Sincerely,        Tana Pascal, CNP

## 2023-03-28 NOTE — LETTER
March 28, 2023      Bandarjoaquin YANIQUE Nikunj  [address]  [place] [address]        To Whom It May Concern:    Bandarjoaquin CHANEL Calvin was seen in our clinic.  Recommend ergonomic assessment with sit to stand workstation for lumbar radiculopathy related to work comp injury.      Sincerely,        Tana Pascal, CNP

## 2023-03-28 NOTE — PATIENT INSTRUCTIONS
~Please call our [facility] Nurse Navigation line [phone] with any questions or concerns about your treatment plan, if symptoms worsen and you would like to be seen urgently, or if you have problems controlling bladder and bowel function.  ~Please note that any My Chart messages may take multiple days for a response due to the high volume of patients seen in clinic.   Anything sent Thursday night or after will be answered the following week when able, as Tana Pascal CNP does not work in clinic on Fridays.        ~You have been referred for Physical Therapy to [facility]. They will call you to schedule an appointment.      Scheduling phone number is [phone] for [facility], [place], or [place] location.  If you have not heard from the scheduling office within 2 business days, please call [phone] for ALL other locations.    Discussed the importance of core strengthening, ROM, stretching exercises and how each of these entities is important in decreasing pain and improving long term spine health.  The purpose of physical therapy is to teach you an individualized home exercise program.  These exercises need to be performed every day in order to decrease pain and prevent future occurrences of pain.

## 2023-03-28 NOTE — PROGRESS NOTES
ASSESSMENT: Yeny Calvin is a 43 year old male who presents for consultation at the request of PCP Susu Youngblood, with a past medical history significant for hypertension, testicular hypofunction, immunosuppression, history of kidney transplant, hepatitis B carrier, who presents today for new patient evaluation of:    -Chronic left low back pain with left lumbar radiculopathy L5 dermatomal pattern with some perceived left lower extremity weakness ongoing since fall while at work 1/25/2023.  Lumbar CT scan with L5-S1 disc bulge with lateral recess stenosis likely contributing to current symptoms.    Patient is neurologically intact on exam which is reassuring. No myelopathic or red flag symptoms.     OSWESTRY DISABILITY INDEX 3/28/2023   Count 10   Sum 8   Oswestry Score (%) 16   Some recent data might be hidden            Diagnoses and all orders for this visit:  Left lumbar radiculopathy  -     Physical Therapy Referral; Future  Chronic left-sided low back pain with left-sided sciatica  -     Physical Therapy Referral; Future  Fall, sequela  -     Orthopedic  Referral  -     Physical Therapy Referral; Future  Bulging lumbar disc  -     Physical Therapy Referral; Future    PLAN:   Reviewed spine anatomy and disease process. Discussed diagnosis and treatment options with the patient today. A shared decision making model was used.  The patient's values and choices were respected. The following represents what was discussed and decided upon by the provider and the patient.      -DIAGNOSTIC TESTS:  Images were personally reviewed and interpreted and explained to patient today using spine model.   --Recommend lumbar spine MRI if symptoms worsen/change at any time but currently he is neurologically intact on exam and improving with physical therapy therefore we will hold off on further imaging needs.  -- Lumbar spine CT scan 1/25/2023 mild disc degeneration L5-S1 with shallow disc bulge with mild lateral  recess stenosis.  -- Left hip x-ray 1/25/2023 shows no acute fracture or dislocation.    -PHYSICAL THERAPY: Referral placed for physical therapy to continue working with physical therapy for core strengthening as well as nerve glide exercises.  Patient is improving with PT so far as well.  Discussed the importance of core strengthening, ROM, stretching exercises with the patient and how each of these entities is important in decreasing pain.  Explained to the patient that the purpose of physical therapy is to teach the patient a home exercise program.  These exercises need to be performed every day in order to decrease pain and prevent future occurrences of pain.        -MEDICATIONS: No changes in medications.  Did advise patient to continue with diclofenac gel and cyclobenzaprine as needed.  Discussed multiple medication options today with patient. Discussed risks, side effects, and proper use of medications. Patient verbalized understanding.    -INTERVENTIONS: Recommend holding on injections as patient is improving with physical therapy at this point.  Did discuss that if symptoms worsen or fail to further improve with physical therapy down the road we can consider a left L5-S1 transforaminal epidural steroid injection as he does have again a disc bulge with L5 impingement in the lateral recess at L5-S1.  Discussed risks and benefits of injections with patient today.    *Letter given for establishing sit to stand workstation and ergonomic assessment at work as well as allowing patient to transition from sit to stand as needed every 1 hour.    -PATIENT EDUCATION:  Total time of 46 minutes, on the day of service, spent with the patient, reviewing the chart, placing orders, and documenting.   -Today we also discussed the issues related to the current COVID-19 pandemic, the pros and cons of the current treatment plan, the CDC guidelines such as social distancing, washing hands, masking, and covering the  cough.    -FOLLOW-UP:   Follow-up 6 weeks, sooner if pain is worsening or new symptoms arise    Advised patient to call the [facility] if symptoms worsen or you have problems controlling bladder and bowel function.   ______________________________________________________________________    SUBJECTIVE:  HPI:  Yeny Calvin  Is a 43 year old male who presents today for new patient evaluation of low back pain left lumbosacral junction rating to the lateral hip with intermittent pain into the lateral thigh and lateral and anterior shin as well as lateral foot.  Symptoms onset post fall at work 1/25/2023 where he slipped on ice, his legs came out from underneath him and he landed on his low back and left side.  No prior back pain issues before this fall.  Initially pain was severe at a 9/10 and he did go to the ED on 1/25/2023, since then he has been noting improvement with physical therapy and symptoms are much more tolerable today at a 3/10.  He does feel some perceived weakness still in his left lower extremity but denies any episodes of his leg giving out on him with normal walking.  Denies current right-sided symptoms.  Denies bowel or bladder loss control, denies saddle anesthesia    **Work comp injury: ED visit 1/25/2023 post fall where he slipped on ice landing directly on his low back and left SI joint/left hip.    -Treatment to Date: No prior spinal surgery or spinal injections  Physical therapy x3 sessions last 3/21/2023 LBP with some benefit    -Medications:  Cyclobenzaprine as needed  Lidocaine patch as needed  Diclofenac gel as needed-patient not currently taking    Oxycodone prescribed by the ED, patient stopped this medication as it was not needed.    Current Outpatient Medications   Medication     cyclobenzaprine (FLEXERIL) 10 MG tablet     lidocaine (LIDODERM) 5 % patch     lisinopril (PRINIVIL,ZESTRIL) 10 MG tablet     mycophenolic acid (MYFORTIC - GENERIC EQUIVALENT) 180 MG EC tablet     PROGRAF 1  "MG PO CAPSULE     No current facility-administered medications for this visit.       Allergies   Allergen Reactions     Metoprolol GI Disturbance       Past Medical History:   Diagnosis Date     Kidney failure      S/P kidney transplant      Undescended testes     brought down late, about age 12-14.        Patient Active Problem List   Diagnosis     Undescended testicle     Testicular hypofunction     Atrophy of both testes     Kidney replaced by transplant     End-stage renal disease (H)     Hepatitis B carrier (H)     History of kidney transplant     Hypertension     Seasonal allergic rhinitis     Immunosuppression (H)     Seasonal allergies     Lumbar radiculopathy     Fall, sequela       Past Surgical History:   Procedure Laterality Date     bilateral orchidopexy  1994     HYPERTENSION MG       ZZC TRANSPLANTATION OF KIDNEY         Family History   Problem Relation Age of Onset     Unknown/Adopted Mother      Unknown/Adopted Father        Reviewed past medical, surgical, and family history with patient found on new patient intake packet located in EMR [employer] tab.     SOCIAL HX: Patient works as an  patient denies smoking/tobacco use, denies alcohol use, denies history of being a heavy drinker, denies recreational drug use.    ROS: Positive for back pain, leg pain.  Specifically negative for bowel/bladder dysfunction, balance changes, headache, dizziness, foot drop, fevers, chills, appetite changes, nausea/vomiting, unexplained weight loss. Otherwise 13 systems reviewed are negative. Please see the patient's intake questionnaire from today for details.    OBJECTIVE:  BP (!) 148/88 (BP Location: Right arm, Patient Position: Sitting)   Ht 5' 10\" (1.778 m)   Wt 145 lb (65.8 kg)   SpO2 100%   BMI 20.81 kg/m      PHYSICAL EXAMINATION:    --CONSTITUTIONAL:  Vital signs as above.  No acute distress.  The patient is well nourished and well groomed.  --PSYCHIATRIC:  Appropriate mood and affect. The patient " is awake, alert, oriented to person, place, time and answering questions appropriately with clear speech.    --SKIN:  Skin over the face, bilateral lower extremities, and posterior torso is clean, dry, intact without rashes.    --RESPIRATORY: Normal rhythm and effort. No abnormal accessory muscle breathing patterns noted.   --ABDOMINAL:  Non-distended.  --STANDING EXAMINATION:  Normal lumbar lordosis noted, no lateral shift.  --MUSCULOSKELETAL: Lumbar spine inspection reveals no evidence of deformity. Range of motion is not limited in lumbar flexion, extension, lateral rotation. No tenderness to palpation lumbar spine. Straight leg raising is negative to radicular pain on the right, positive on the left to back and leg pain. Sciatic notch nontender on the right, tender on the left  --GROSS MOTOR: Gait is non-antalgic. Easily arises from a seated position.   --LOWER EXTREMITY MOTOR TESTING:  Plantar flexion left 5/5, right 5/5   Dorsiflexion left 5/5, right 5/5   Great toe MTP extension left 5/5, right 5/5  Knee flexion left 5/5, right 5/5  Knee extension left 5/5, right 5/5   Hip flexion left 5/5, right 5/5  Hip abduction left 5/5, right 5/5  Hip adduction left 5/5, right 5/5   --HIPS: Full range of motion bilaterally.   --NEUROLOGICAL:  2/4 patellar, medial hamstring, and achilles reflexes bilaterally.  Sensation to light touch is intact in the bilateral L4, L5, and S1 dermatomes. Babinski is negative. No clonus.  Negative Trisha reflex bilaterally.  --VASCULAR:  2/4 dorsalis pedis and posterior tibialsi pulses bilaterally.  Bilateral lower extremities are warm.  There is no pitting edema of the bilateral lower extremities.    RESULTS: [facility] medical records from [facility]where were reviewed today.    Imaging: Spine imaging was personally reviewed and interpreted today. The images were shown to the patient and the findings were explained using a spine model.      Lumbar spine CT scan and  left hip x-ray reviewed today.

## 2023-04-03 ENCOUNTER — OFFICE VISIT (OUTPATIENT)
Dept: FAMILY MEDICINE | Facility: CLINIC | Age: 43
End: 2023-04-03
Payer: COMMERCIAL

## 2023-04-03 ENCOUNTER — LAB (OUTPATIENT)
Dept: FAMILY MEDICINE | Facility: CLINIC | Age: 43
End: 2023-04-03

## 2023-04-03 VITALS
SYSTOLIC BLOOD PRESSURE: 134 MMHG | TEMPERATURE: 97.5 F | HEART RATE: 77 BPM | BODY MASS INDEX: 20.31 KG/M2 | HEIGHT: 70 IN | DIASTOLIC BLOOD PRESSURE: 86 MMHG | WEIGHT: 141.9 LBS | RESPIRATION RATE: 16 BRPM | OXYGEN SATURATION: 100 %

## 2023-04-03 DIAGNOSIS — Z94.0 STATUS POST KIDNEY TRANSPLANT: ICD-10-CM

## 2023-04-03 DIAGNOSIS — I10 PRIMARY HYPERTENSION: Primary | ICD-10-CM

## 2023-04-03 DIAGNOSIS — D84.9 IMMUNOSUPPRESSION (H): ICD-10-CM

## 2023-04-03 DIAGNOSIS — Z12.11 SCREEN FOR COLON CANCER: ICD-10-CM

## 2023-04-03 PROCEDURE — 99214 OFFICE O/P EST MOD 30 MIN: CPT | Performed by: FAMILY MEDICINE

## 2023-04-03 RX ORDER — LISINOPRIL 10 MG/1
15 TABLET ORAL DAILY
Qty: 135 TABLET | Refills: 3 | Status: SHIPPED | OUTPATIENT
Start: 2023-04-03 | End: 2024-01-02

## 2023-04-03 RX ORDER — LISINOPRIL 10 MG/1
15 TABLET ORAL DAILY
Qty: 135 TABLET | Refills: 3 | Status: SHIPPED | OUTPATIENT
Start: 2023-04-03 | End: 2023-04-03

## 2023-04-03 ASSESSMENT — PAIN SCALES - GENERAL: PAINLEVEL: MILD PAIN (2)

## 2023-04-03 NOTE — PATIENT INSTRUCTIONS
Primary hypertension   Increase lisinopril from  10 to 15 mg once daily   Follow up on phone ok in 4-6 weeks or see nephrologist.  Target BP is < 130/80    Do follow up with nephrologist as routine.      Recent Labs   Lab Test 12/02/19  0813 08/16/19  0809   CHOL 219* 205*   HDL 48 50   * 124*   TRIG 138 154*      The 10-year ASCVD risk score (David RAMOS, et al., 2019) is: 3.2%    Values used to calculate the score:      Age: 43 years      Sex: Male      Is Non- : No      Diabetic: No      Tobacco smoker: No      Systolic Blood Pressure: 134 mmHg      Is BP treated: Yes      HDL Cholesterol: 48 mg/dL      Total Cholesterol: 219 mg/dL

## 2023-04-03 NOTE — PROGRESS NOTES
Assessment & Plan     Primary hypertension  Plan: Increase lisinopril from  10 to 15 mg once daily   Follow up on phone ok in 4-6 weeks or see nephrologist.  Target BP is < 130/80  - Lipid Profile (Chol, Trig, HDL, LDL calc); Future  - Basic metabolic panel  (Ca, Cl, CO2, Creat, Gluc, K, Na, BUN); Future  - Albumin Random Urine Quantitative with Creat Ratio; Future  Advised to get fasting lipid and blood test completed  - he is willing to complete it next week.    Potential medication side effects were discussed with the patient; let me know if any occur.    Immunosuppression (H)  Status post kidney transplant  Plan: Follow Nephrologist at [facility]-Dr Hamilton-with routine labs every 3 months     Screen for colon cancer   COLOGUARD([employer]); Future    Ordering of each unique test  Prescription drug management  I spent a total of 36 minutes on the day of the visit.   Time spent by me doing chart review, history and exam, documentation and further activities per the note           Susu Youngblood MD  [facility]    [facility]   Yeny is a 43 year old, presenting for the following health issues:  Hypertension         View : No data to display.              HPI     Hypertension Follow-up      Do you check your blood pressure regularly outside of the clinic? Yes     Are you following a low salt diet? Yes    Are your blood pressures ever more than 140 on the top number (systolic) OR more   than 90 on the bottom number (diastolic), for example 140/90? Yes    Rt kidney transplant 2007.  Follow Nephrologist at [facility]-Dr Hamilton-with routine labs every 3 months     He is not fasting today and would like to get blood test completed next week.  He has noted Blood pressure to be sometimes in 130's/upper 8-.  Wondering about changing the dose of lisinopril.    Gets labs completed at [facility]- every 3 months  probably has an appointment April 25th       Review of Systems   Constitutional, HEENT,  "cardiovascular, pulmonary, GI, , musculoskeletal, neuro, skin, endocrine and psych systems are negative, except as otherwise noted.      Objective    /86 (BP Location: Left arm, Patient Position: Sitting, Cuff Size: Adult Regular)   Pulse 77   Temp 97.5  F (36.4  C) (Temporal)   Resp 16   Ht 1.765 m (5' 9.5\")   Wt 64.4 kg (141 lb 14.4 oz)   SpO2 100%   BMI 20.65 kg/m    Body mass index is 20.65 kg/m .  Physical Exam   GENERAL: healthy, alert and no distress  NECK: no adenopathy, no asymmetry, masses, or scars and thyroid normal to palpation  RESP: lungs clear to auscultation - no rales, rhonchi or wheezes  CV: regular rate and rhythm, normal S1 S2, no S3 or S4, no murmur, click or rub, no peripheral edema and peripheral pulses strong  PSYCH: mentation appears normal, affect normal/bright                    "

## 2023-04-04 ENCOUNTER — THERAPY VISIT (OUTPATIENT)
Dept: PHYSICAL THERAPY | Facility: CLINIC | Age: 43
End: 2023-04-04
Payer: OTHER MISCELLANEOUS

## 2023-04-04 DIAGNOSIS — M54.16 LUMBAR RADICULOPATHY: Primary | ICD-10-CM

## 2023-04-04 PROCEDURE — 97110 THERAPEUTIC EXERCISES: CPT | Mod: GP

## 2023-04-04 PROCEDURE — 97140 MANUAL THERAPY 1/> REGIONS: CPT | Mod: GP

## 2023-05-04 NOTE — PROGRESS NOTES
Assessment:     Diagnoses and all orders for this visit:  Chronic left-sided low back pain with left-sided sciatica  Left lumbar radiculopathy  Bulging lumbar disc     Yeny Calvin is a 43 year old y.o. male with past medical history significant for hypertension, testicular hypofunction, immunosuppression, history of kidney transplant, hepatitis B carrier who presents today for follow-up regarding:    -Chronic left low back pain with left lumbar radiculopathy aggravated with fall at work 1/25/2023.  Symptoms significantly improved with physical therapy and he is doing well.     Plan:     A shared decision making plan was used. The patient's values and choices were respected. Prior medical records were reviewed today. The following represents what was discussed and decided upon by the provider and the patient.        -DIAGNOSTIC TESTS: Images were personally reviewed and interpreted.   -- Lumbar spine CT scan 1/25/2023 mild disc degeneration L5-S1 with shallow disc bulge with mild lateral recess stenosis.  -- Left hip x-ray 1/25/2023 shows no acute fracture or dislocation.    -INTERVENTIONS: No recommendations for injections at this time.    -MEDICATIONS: No further medication changes.    -PHYSICAL THERAPY: Encourage patient to continue with home exercises from prior physical therapy sessions which she is doing well with.  Discussed the importance of core strengthening, ROM, stretching exercises with the patient and how each of these entities is important in decreasing pain.  Explained to the patient that the purpose of physical therapy is to teach the patient a home exercise program.  These exercises need to be performed every day in order to decrease pain and prevent future occurrences of pain.        -Workability letter completed.    -PATIENT EDUCATION:  Total time of 22 minutes, on the day of service, spent with the patient, reviewing the chart, placing orders, and documenting.   -Today we also discussed the  issues related to the current COVID-19 pandemic, the pros and cons of the current treatment plan, the CDC guidelines such as social distancing, washing the hands, and covering the cough.    -FOLLOW UP: Follow-up as needed  Advised to contact clinic if symptoms worsen or change.    Subjective:     Yeny Calvin is a 43 year old male who presents today for follow-up regarding ongoing left buttock pain in which symptoms had significantly improved with recent physical therapy and overall he is doing really well.  Currently his pain is a 2/10, 3 at its worst, 1 at its best.  He is very happy with his response with physical therapy and is here for work letter.    **Work comp injury: ED visit 1/25/2023 post fall where he slipped on ice landing directly on his low back and left SI joint/left hip.     -Treatment to Date: No prior spinal surgery or spinal injections  Physical therapy x4 sessions last 3/21/2023 LBP with some benefit     -Medications:  Cyclobenzaprine as needed  Lidocaine patch as needed  Diclofenac gel as needed-patient not currently taking     Oxycodone prescribed by the ED, patient stopped this medication as it was not needed.    Patient Active Problem List   Diagnosis     Undescended testicle     Testicular hypofunction     Atrophy of both testes     Kidney replaced by transplant     End-stage renal disease (H)     Hepatitis B carrier (H)     History of kidney transplant     Hypertension     Seasonal allergic rhinitis     Immunosuppression (H)     Seasonal allergies     Lumbar radiculopathy     Fall, sequela       Current Outpatient Medications   Medication     lidocaine (LIDODERM) 5 % patch     lisinopril (ZESTRIL) 10 MG tablet     mycophenolic acid (MYFORTIC - GENERIC EQUIVALENT) 180 MG EC tablet     PROGRAF 1 MG PO CAPSULE     No current facility-administered medications for this visit.       Allergies   Allergen Reactions     Metoprolol GI Disturbance       Past Medical History:   Diagnosis Date      Kidney failure      S/P kidney transplant      Undescended testes     brought down late, about age 12-14.        Review of Systems  ROS:  Specifically negative for bowel/bladder dysfunction, balance changes, headache, dizziness, foot drop, fevers, chills, appetite changes, nausea/vomiting, unexplained weight loss. Otherwise 13 systems reviewed are negative. Please see the patient's intake questionnaire from today for details.    Reviewed Social, Family, Past Medical and Past Surgical history with patient, no significant changes noted since prior visit.     Objective:     BP (!) 158/76 (BP Location: Right arm, Patient Position: Sitting)     PHYSICAL EXAMINATION:    --CONSTITUTIONAL: Well developed, well nourished, healthy appearing individual.  --PSYCHIATRIC: Appropriate mood and affect. No difficulty interacting due to temper, social withdrawal, or memory issues.  --SKIN: Lumbar region is dry and intact.   --RESPIRATORY: Normal rhythm and effort. No abnormal accessory muscle breathing patterns noted.   --MUSCULOSKELETAL:  Normal lumbar lordosis noted, no lateral shift.  --GROSS MOTOR: Easily arises from a seated position. Gait is non-antalgic  --LUMBAR SPINE:  Inspection reveals no evidence of deformity.     RESULTS:   Imaging: Spine imaging was reviewed today. The images were shown to the patient and the findings were explained using a spine model.

## 2023-05-08 ENCOUNTER — OFFICE VISIT (OUTPATIENT)
Dept: PHYSICAL MEDICINE AND REHAB | Facility: CLINIC | Age: 43
End: 2023-05-08
Payer: OTHER MISCELLANEOUS

## 2023-05-08 VITALS — DIASTOLIC BLOOD PRESSURE: 76 MMHG | SYSTOLIC BLOOD PRESSURE: 158 MMHG

## 2023-05-08 DIAGNOSIS — M51.369 BULGING LUMBAR DISC: ICD-10-CM

## 2023-05-08 DIAGNOSIS — G89.29 CHRONIC LEFT-SIDED LOW BACK PAIN WITH LEFT-SIDED SCIATICA: Primary | ICD-10-CM

## 2023-05-08 DIAGNOSIS — M54.16 LEFT LUMBAR RADICULOPATHY: ICD-10-CM

## 2023-05-08 DIAGNOSIS — M54.42 CHRONIC LEFT-SIDED LOW BACK PAIN WITH LEFT-SIDED SCIATICA: Primary | ICD-10-CM

## 2023-05-08 PROCEDURE — 99213 OFFICE O/P EST LOW 20 MIN: CPT | Performed by: NURSE PRACTITIONER

## 2023-05-08 ASSESSMENT — PAIN SCALES - GENERAL: PAINLEVEL: MILD PAIN (2)

## 2023-05-08 NOTE — LETTER
May 8, 2023      Yeny Calvin  [id number]  [facility] [place] [facility] [id number]        To Whom It May Concern:    Yeny Calvin was seen in our clinic. He may return to work without restrictions.I recommend long term use of sit to stand workstation in order to be able to change position from sit to stand as needed.       Sincerely,        Tana Pascal, CNP

## 2023-05-08 NOTE — PATIENT INSTRUCTIONS
~Please call our [facility] Nurse Navigation line [phone] with any questions or concerns about your treatment plan, if symptoms worsen and you would like to be seen urgently, or if you have problems controlling bladder and bowel function.  ~Please note that any My Chart messages may take multiple days for a response due to the high volume of patients seen in clinic.   Anything sent Thursday night or after will be answered the following week when able, as Tana Pascal CNP does not work in clinic on Fridays.        Importance of specialized Physical Therapy: Discussed the importance of core strengthening, ROM, stretching exercises with the patient and how each of these entities is important in decreasing pain.  Explained to the patient that the purpose of physical therapy is to teach the patient a home exercise program individualized to them and their specific health concerns.  These exercises need to be performed every day in order to decrease pain and prevent future occurrences of pain.

## 2023-05-08 NOTE — Clinical Note
5/8/2023         RE: Yeny Calvin  [address]  [facility] [address]        Dear Colleague,    Thank you for referring your patient, Yeny Calvin, to the [facility]. Please see a copy of my visit note below.      Assessment:     There are no diagnoses linked to this encounter.   Yeny Calvin is a 43 year old y.o. male with past medical history significant for hypertension, testicular hypofunction, immunosuppression, history of kidney transplant, hepatitis B carrier who presents today for follow-up regarding:    -***     Plan:     A shared decision making plan was used. The patient's values and choices were respected. Prior medical records were reviewed today. The following represents what was discussed and decided upon by the provider and the patient.        -DIAGNOSTIC TESTS: Images were personally reviewed and interpreted.   --***  -- Lumbar spine CT scan 1/25/2023 mild disc degeneration L5-S1 with shallow disc bulge with mild lateral recess stenosis.  -- Left hip x-ray 1/25/2023 shows no acute fracture or dislocation.    -INTERVENTIONS: ***    -MEDICATIONS: ***   Discussed side effects of medications and proper use. Patient verbalized understanding.    -PHYSICAL THERAPY: ***  Discussed the importance of core strengthening, ROM, stretching exercises with the patient and how each of these entities is important in decreasing pain.  Explained to the patient that the purpose of physical therapy is to teach the patient a home exercise program.  These exercises need to be performed every day in order to decrease pain and prevent future occurrences of pain.        -PATIENT EDUCATION:  Total time of *** minutes, on the day of service, spent with the patient, reviewing the chart, placing orders, and documenting.   -Today we also discussed the issues related to the current COVID-19 pandemic, the pros and cons of the current treatment plan, the CDC guidelines such as  social distancing, washing the hands, and covering the cough.    -FOLLOW UP: ***   Advised to contact clinic if symptoms worsen or change.    Subjective:     Yeny Calvin is a 43 year old male who presents today for follow-up regarding ***    **Work comp injury: ED visit 1/25/2023 post fall where he slipped on ice landing directly on his low back and left SI joint/left hip.     -Treatment to Date: No prior spinal surgery or spinal injections  Physical therapy x3 sessions last 3/21/2023 LBP with some benefit     -Medications:  Cyclobenzaprine as needed  Lidocaine patch as needed  Diclofenac gel as needed-patient not currently taking     Oxycodone prescribed by the ED, patient stopped this medication as it was not needed.    Patient Active Problem List   Diagnosis     Undescended testicle     Testicular hypofunction     Atrophy of both testes     Kidney replaced by transplant     End-stage renal disease (H)     Hepatitis B carrier (H)     History of kidney transplant     Hypertension     Seasonal allergic rhinitis     Immunosuppression (H)     Seasonal allergies     Lumbar radiculopathy     Fall, sequela       Current Outpatient Medications   Medication     cyclobenzaprine (FLEXERIL) 10 MG tablet     lidocaine (LIDODERM) 5 % patch     lisinopril (PRINIVIL,ZESTRIL) 10 MG tablet     lisinopril (ZESTRIL) 10 MG tablet     mycophenolic acid (MYFORTIC - GENERIC EQUIVALENT) 180 MG EC tablet     PROGRAF 1 MG PO CAPSULE     No current facility-administered medications for this visit.       Allergies   Allergen Reactions     Metoprolol GI Disturbance       Past Medical History:   Diagnosis Date     Kidney failure      S/P kidney transplant      Undescended testes     brought down late, about age 12-14.        Review of Systems  ROS: ***  Specifically negative for bowel/bladder dysfunction, balance changes, headache, dizziness, foot drop, fevers, chills, appetite changes, nausea/vomiting, unexplained weight loss. Otherwise 13  systems reviewed are negative. Please see the patient's intake questionnaire from today for details.    Reviewed Social, Family, Past Medical and Past Surgical history with patient, no significant changes noted since prior visit.     Objective:     There were no vitals taken for this visit.    PHYSICAL EXAMINATION:    --CONSTITUTIONAL: Well developed, well nourished, healthy appearing individual.  --PSYCHIATRIC: Appropriate mood and affect. No difficulty interacting due to temper, social withdrawal, or memory issues.  --SKIN: Lumbar region is dry and intact.   --RESPIRATORY: Normal rhythm and effort. No abnormal accessory muscle breathing patterns noted.   --MUSCULOSKELETAL:  Normal lumbar lordosis noted, no lateral shift.  --GROSS MOTOR: Easily arises from a seated position. Gait is non-antalgic  --LUMBAR SPINE:  Inspection reveals no evidence of deformity. Range of motion is not limited in lumbar flexion, extension, lateral rotation. No tenderness to palpation lumbar spine. Straight leg raising is negative to radicular pain. Sciatic notch non-tender.   --SACROILIAC JOINT: *** Distraction. *** Adriana's with reproduction of pain to affected extremity. *** Gaenslen's Test with reproduction of pain to affected extremity. Posterior Pelvic Pain Provocative Test ***. Sacroiliac Joint Compression Test ***. Sacral Thrust/Yeoman's Test ***.  --LOWER EXTREMITY MOTOR TESTING:  Plantar flexion left 5/5, right 5/5   Dorsiflexion left 5/5, right 5/5   Great toe MTP extension left 5/5, right 5/5  Knee flexion left 5/5, right 5/5  Knee extension left 5/5, right 5/5   Hip flexion left 5/5, right 5/5  Hip abduction left 5/5, right 5/5  Hip adduction left 5/5, right 5/5   --HIPS: Full range of motion bilaterally.   --NEUROLOGIC: Bilateral patellar and achilles reflexes are physiologic and symmetric. Sensation to light touch is intact in the bilateral L4, L5, and S1 dermatomes.    RESULTS:   Imaging: Spine imaging was reviewed today.  The images were shown to the patient and the findings were explained using a spine model.                              Assessment:     There are no diagnoses linked to this encounter.   Yeny Calvin is a 43 year old y.o. male with past medical history significant for hypertension, testicular hypofunction, immunosuppression, history of kidney transplant, hepatitis B carrier who presents today for follow-up regarding:    -***     Plan:     A shared decision making plan was used. The patient's values and choices were respected. Prior medical records were reviewed today. The following represents what was discussed and decided upon by the provider and the patient.        -DIAGNOSTIC TESTS: Images were personally reviewed and interpreted.   --***  -- Lumbar spine CT scan 1/25/2023 mild disc degeneration L5-S1 with shallow disc bulge with mild lateral recess stenosis.  -- Left hip x-ray 1/25/2023 shows no acute fracture or dislocation.    -INTERVENTIONS: ***    -MEDICATIONS: ***   Discussed side effects of medications and proper use. Patient verbalized understanding.    -PHYSICAL THERAPY: ***  Discussed the importance of core strengthening, ROM, stretching exercises with the patient and how each of these entities is important in decreasing pain.  Explained to the patient that the purpose of physical therapy is to teach the patient a home exercise program.  These exercises need to be performed every day in order to decrease pain and prevent future occurrences of pain.        -PATIENT EDUCATION:  Total time of *** minutes, on the day of service, spent with the patient, reviewing the chart, placing orders, and documenting.   -Today we also discussed the issues related to the current COVID-19 pandemic, the pros and cons of the current treatment plan, the CDC guidelines such as social distancing, washing the hands, and covering the cough.    -FOLLOW UP: ***   Advised to contact clinic if symptoms worsen or  change.    Subjective:     Yeny Calvin is a 43 year old male who presents today for follow-up regarding ***    **Work comp injury: ED visit 1/25/2023 post fall where he slipped on ice landing directly on his low back and left SI joint/left hip.     -Treatment to Date: No prior spinal surgery or spinal injections  Physical therapy x4 sessions last 3/21/2023 LBP with some benefit     -Medications:  Cyclobenzaprine as needed  Lidocaine patch as needed  Diclofenac gel as needed-patient not currently taking     Oxycodone prescribed by the ED, patient stopped this medication as it was not needed.    Patient Active Problem List   Diagnosis     Undescended testicle     Testicular hypofunction     Atrophy of both testes     Kidney replaced by transplant     End-stage renal disease (H)     Hepatitis B carrier (H)     History of kidney transplant     Hypertension     Seasonal allergic rhinitis     Immunosuppression (H)     Seasonal allergies     Lumbar radiculopathy     Fall, sequela       Current Outpatient Medications   Medication     cyclobenzaprine (FLEXERIL) 10 MG tablet     lidocaine (LIDODERM) 5 % patch     lisinopril (PRINIVIL,ZESTRIL) 10 MG tablet     lisinopril (ZESTRIL) 10 MG tablet     mycophenolic acid (MYFORTIC - GENERIC EQUIVALENT) 180 MG EC tablet     PROGRAF 1 MG PO CAPSULE     No current facility-administered medications for this visit.       Allergies   Allergen Reactions     Metoprolol GI Disturbance       Past Medical History:   Diagnosis Date     Kidney failure      S/P kidney transplant      Undescended testes     brought down late, about age 12-14.        Review of Systems  ROS: ***  Specifically negative for bowel/bladder dysfunction, balance changes, headache, dizziness, foot drop, fevers, chills, appetite changes, nausea/vomiting, unexplained weight loss. Otherwise 13 systems reviewed are negative. Please see the patient's intake questionnaire from today for details.    Reviewed Social, Family,  Past Medical and Past Surgical history with patient, no significant changes noted since prior visit.     Objective:     There were no vitals taken for this visit.    PHYSICAL EXAMINATION:    --CONSTITUTIONAL: Well developed, well nourished, healthy appearing individual.  --PSYCHIATRIC: Appropriate mood and affect. No difficulty interacting due to temper, social withdrawal, or memory issues.  --SKIN: Lumbar region is dry and intact.   --RESPIRATORY: Normal rhythm and effort. No abnormal accessory muscle breathing patterns noted.   --MUSCULOSKELETAL:  Normal lumbar lordosis noted, no lateral shift.  --GROSS MOTOR: Easily arises from a seated position. Gait is non-antalgic  --LUMBAR SPINE:  Inspection reveals no evidence of deformity. Range of motion is not limited in lumbar flexion, extension, lateral rotation. No tenderness to palpation lumbar spine. Straight leg raising is negative to radicular pain. Sciatic notch non-tender.   --SACROILIAC JOINT: *** Distraction. *** Adriana's with reproduction of pain to affected extremity. *** Gaenslen's Test with reproduction of pain to affected extremity. Posterior Pelvic Pain Provocative Test ***. Sacroiliac Joint Compression Test ***. Sacral Thrust/Yeoman's Test ***.  --LOWER EXTREMITY MOTOR TESTING:  Plantar flexion left 5/5, right 5/5   Dorsiflexion left 5/5, right 5/5   Great toe MTP extension left 5/5, right 5/5  Knee flexion left 5/5, right 5/5  Knee extension left 5/5, right 5/5   Hip flexion left 5/5, right 5/5  Hip abduction left 5/5, right 5/5  Hip adduction left 5/5, right 5/5   --HIPS: Full range of motion bilaterally.   --NEUROLOGIC: Bilateral patellar and achilles reflexes are physiologic and symmetric. Sensation to light touch is intact in the bilateral L4, L5, and S1 dermatomes.    RESULTS:   Imaging: Spine imaging was reviewed today. The images were shown to the patient and the findings were explained using a spine model.                              Again,  thank you for allowing me to participate in the care of your patient.        Sincerely,        Tana Pascal CNP

## 2023-08-17 ENCOUNTER — ANCILLARY PROCEDURE (OUTPATIENT)
Dept: GENERAL RADIOLOGY | Facility: CLINIC | Age: 43
End: 2023-08-17
Attending: PHYSICIAN ASSISTANT
Payer: COMMERCIAL

## 2023-08-17 ENCOUNTER — TELEPHONE (OUTPATIENT)
Dept: URGENT CARE | Facility: URGENT CARE | Age: 43
End: 2023-08-17

## 2023-08-17 ENCOUNTER — OFFICE VISIT (OUTPATIENT)
Dept: URGENT CARE | Facility: URGENT CARE | Age: 43
End: 2023-08-17
Payer: COMMERCIAL

## 2023-08-17 VITALS
HEIGHT: 70 IN | SYSTOLIC BLOOD PRESSURE: 142 MMHG | OXYGEN SATURATION: 100 % | RESPIRATION RATE: 15 BRPM | DIASTOLIC BLOOD PRESSURE: 97 MMHG | TEMPERATURE: 98.1 F | BODY MASS INDEX: 21.19 KG/M2 | HEART RATE: 70 BPM | WEIGHT: 148 LBS

## 2023-08-17 DIAGNOSIS — M54.2 CERVICALGIA: ICD-10-CM

## 2023-08-17 DIAGNOSIS — M79.672 LEFT FOOT PAIN: ICD-10-CM

## 2023-08-17 DIAGNOSIS — S29.012A UPPER BACK STRAIN, INITIAL ENCOUNTER: ICD-10-CM
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
## 2023-08-17 PROCEDURE — 72040 X-RAY EXAM NECK SPINE 2-3 VW: CPT | Mod: TC | Performed by: STUDENT IN AN ORGANIZED HEALTH CARE EDUCATION/TRAINING PROGRAM

## 2023-08-17 PROCEDURE — 73630 X-RAY EXAM OF FOOT: CPT | Mod: TC | Performed by: RADIOLOGY

## 2023-08-17 PROCEDURE — 99214 OFFICE O/P EST MOD 30 MIN: CPT | Performed by: PHYSICIAN ASSISTANT

## 2023-08-17 RX ORDER — METHOCARBAMOL 750 MG/1
750 TABLET, FILM COATED ORAL 4 TIMES DAILY PRN
Qty: 30 TABLET | Refills: 0 | Status: SHIPPED | OUTPATIENT
Start: 2023-08-17

## 2023-08-17 RX ORDER — HYDROCODONE BITARTRATE AND ACETAMINOPHEN 5; 325 MG/1; MG/1
1 TABLET ORAL EVERY 6 HOURS PRN
Qty: 12 TABLET | Refills: 0 | Status: SHIPPED | OUTPATIENT
Start: 2023-08-17 | End: 2023-08-20

## 2023-08-17 RX ORDER — METHYLPREDNISOLONE 4 MG
TABLET, DOSE PACK ORAL
Qty: 21 TABLET | Refills: 0 | Status: SHIPPED | OUTPATIENT
Start: 2023-08-17
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
## 2023-11-25 ENCOUNTER — HEALTH MAINTENANCE LETTER (OUTPATIENT)
Age: 43
End: 2023-11-25

## 2023-11-30 NOTE — TELEPHONE ENCOUNTER
Patient had x-ray disc with images from 8/17/23  in pickup binder since 9/2/23 that has not been picked up. Xray disc was mailed out to address on file due to limited space in clinic.

## 2023-12-31 DIAGNOSIS — I10 PRIMARY HYPERTENSION: ICD-10-CM

## 2024-01-02 RX ORDER — LISINOPRIL 10 MG/1
TABLET ORAL
Qty: 45 TABLET | Refills: 0 | Status: SHIPPED | OUTPATIENT
Start: 2024-01-02 | End: 2024-02-02

## 2024-01-02 NOTE — TELEPHONE ENCOUNTER
Voice message in [place] sent for patient reminded to make office visit at [facility] for refill for medication.

## 2024-02-02 DIAGNOSIS — I10 PRIMARY HYPERTENSION: ICD-10-CM

## 2024-02-05 RX ORDER — LISINOPRIL 10 MG/1
TABLET ORAL
Qty: 45 TABLET | Refills: 0 | Status: SHIPPED | OUTPATIENT
Start: 2024-02-05 | End: 2024-02-07

## 2024-02-07 ENCOUNTER — VIRTUAL VISIT (OUTPATIENT)
Dept: FAMILY MEDICINE | Facility: CLINIC | Age: 44
End: 2024-02-07
Payer: COMMERCIAL

## 2024-02-07 DIAGNOSIS — Z94.0 KIDNEY REPLACED BY TRANSPLANT: ICD-10-CM

## 2024-02-07 DIAGNOSIS — I10 PRIMARY HYPERTENSION: Primary | ICD-10-CM

## 2024-02-07 DIAGNOSIS — K21.9 GASTROESOPHAGEAL REFLUX DISEASE WITHOUT ESOPHAGITIS: ICD-10-CM

## 2024-02-07 DIAGNOSIS — Z12.11 SCREEN FOR COLON CANCER: ICD-10-CM

## 2024-02-07 PROCEDURE — 99441 PR PHYSICIAN TELEPHONE EVALUATION 5-10 MIN: CPT | Performed by: FAMILY MEDICINE

## 2024-02-07 RX ORDER — LISINOPRIL 20 MG/1
20 TABLET ORAL DAILY
Qty: 90 TABLET | Refills: 0 | Status: SHIPPED | OUTPATIENT
Start: 2024-02-07 | End: 2024-03-11

## 2024-02-07 RX ORDER — OMEPRAZOLE 40 MG/1
40 CAPSULE, DELAYED RELEASE ORAL DAILY
Qty: 90 CAPSULE | Refills: 3 | Status: SHIPPED | OUTPATIENT
Start: 2024-02-07 | End: 2024-03-11

## 2024-02-07 RX ORDER — LISINOPRIL 10 MG/1
TABLET ORAL
Qty: 45 TABLET | Refills: 0 | Status: CANCELLED | OUTPATIENT
Start: 2024-02-07

## 2024-02-07 NOTE — Clinical Note
930am/Monday 2/26- annual physical with me in office - and Blood pressure recheck. no labs or fasting needed.

## 2024-02-07 NOTE — PROGRESS NOTES
Yeny is a 44 year old who is being evaluated via a billable telephone visit.      What phone number would you like to be contacted at? [phone]   How would you like to obtain your AVS? Brennon    Distant Location (provider location):  On-site    Assessment & Plan   1. Primary hypertension  Plan: uncontrolled  Advised to increase lisinopril from  15 to 20 mg once daily  Has own b monitor at home.  Follow up in 3 weeks for recheck.  Follow up as needed  earlier.  Potential medication side effects were discussed with the patient; let me know if any occur.    - lisinopril (ZESTRIL) 20 MG tablet; Take 1 tablet (20 mg) by mouth daily  Dispense: 90 tablet; Refill: 0    2. Gastroesophageal reflux disease without esophagitis  Plan:   - omeprazole (PRILOSEC) 40 MG DR capsule; Take 1 capsule (40 mg) by mouth daily  Dispense: 90 capsule; Refill: 3  Potential medication side effects were discussed with the patient; let me know if any occur.    3. Kidney replaced by transplant  Plan: - considered this problem when making today's plans  - no interventions today       -followed by specialist.         Reviewed recent blood test from jan 2024  Hyperlipidemia- LDL not at target  Review dietry guidelines    The 10-year ASCVD risk score (Dvaid DK, et al., 2019) is: 3.9%    Values used to calculate the score:      Age: 44 years      Sex: Male      Is Non- : No      Diabetic: No      Tobacco smoker: No      Systolic Blood Pressure: 142 mmHg      Is BP treated: Yes      HDL Cholesterol: 48 mg/dL      Total Cholesterol: 219 mg/dL      4. Screen for colon cancer  Plan: reminder neena- he reports he recievwed the cologaurd and will make time to complete it         Prescription drug management    Annual physical schedule by our team as per patient request  930am/Monday 2/26- annual physical       Subjective   Yeny is a 44 year old, presenting for the following health issues:  Recheck Medication (Requesting  refill of lisinopril), Medication Request (Requesting omeprazole 3-month supply), and Gastrophageal Reflux (Requesting omeprazole 3-month supply)    HPI   Needs refill on omperazole 40 mg once daily    Also been on lisinopril 15 mg once daily and Blood pressure varies- not always < 140/90  No side effects  from  medications  Need refills        Objective           Vitals:  No vitals were obtained today due to virtual visit.    Physical Exam   General: Alert and no distress //Respiratory: No audible wheeze, cough, or shortness of breath // Psychiatric:  Appropriate affect, tone, and pace of words      Office Visit on 11/04/2021   Component Date Value Ref Range Status    SARS CoV2 PCR 11/05/2021 Negative  Negative Final    NEGATIVE: SARS-CoV-2 (COVID-19) RNA not detected, presumed negative.         Phone call duration: 10 minutes  Signed Electronically by: Susu Youngblood MD

## 2024-03-11 ENCOUNTER — OFFICE VISIT (OUTPATIENT)
Dept: FAMILY MEDICINE | Facility: CLINIC | Age: 44
End: 2024-03-11
Payer: COMMERCIAL

## 2024-03-11 ENCOUNTER — ORDERS ONLY (AUTO-RELEASED) (OUTPATIENT)
Dept: FAMILY MEDICINE | Facility: CLINIC | Age: 44
End: 2024-03-11

## 2024-03-11 VITALS
HEIGHT: 70 IN | BODY MASS INDEX: 20.7 KG/M2 | DIASTOLIC BLOOD PRESSURE: 87 MMHG | WEIGHT: 144.6 LBS | HEART RATE: 65 BPM | OXYGEN SATURATION: 98 % | SYSTOLIC BLOOD PRESSURE: 131 MMHG | TEMPERATURE: 97.5 F | RESPIRATION RATE: 18 BRPM

## 2024-03-11 DIAGNOSIS — D84.9 IMMUNOSUPPRESSION (H): ICD-10-CM

## 2024-03-11 DIAGNOSIS — Z23 NEED FOR VACCINATION: ICD-10-CM

## 2024-03-11 DIAGNOSIS — Z12.11 SCREEN FOR COLON CANCER: ICD-10-CM

## 2024-03-11 DIAGNOSIS — K21.9 GASTROESOPHAGEAL REFLUX DISEASE WITHOUT ESOPHAGITIS: ICD-10-CM

## 2024-03-11 DIAGNOSIS — Z94.0 KIDNEY REPLACED BY TRANSPLANT: ICD-10-CM

## 2024-03-11 DIAGNOSIS — I10 PRIMARY HYPERTENSION: Primary | ICD-10-CM

## 2024-03-11 PROCEDURE — 99214 OFFICE O/P EST MOD 30 MIN: CPT | Performed by: FAMILY MEDICINE

## 2024-03-11 RX ORDER — OMEPRAZOLE 40 MG/1
40 CAPSULE, DELAYED RELEASE ORAL DAILY
Qty: 90 CAPSULE | Refills: 3 | Status: SHIPPED | OUTPATIENT
Start: 2024-03-11

## 2024-03-11 RX ORDER — LISINOPRIL 20 MG/1
20 TABLET ORAL DAILY
Qty: 90 TABLET | Refills: 3 | Status: SHIPPED | OUTPATIENT
Start: 2024-03-11

## 2024-03-11 SDOH — HEALTH STABILITY: PHYSICAL HEALTH: ON AVERAGE, HOW MANY DAYS PER WEEK DO YOU ENGAGE IN MODERATE TO STRENUOUS EXERCISE (LIKE A BRISK WALK)?: 4 DAYS

## 2024-03-11 SDOH — HEALTH STABILITY: PHYSICAL HEALTH: ON AVERAGE, HOW MANY MINUTES DO YOU ENGAGE IN EXERCISE AT THIS LEVEL?: 30 MIN

## 2024-03-11 ASSESSMENT — PAIN SCALES - GENERAL: PAINLEVEL: NO PAIN (0)

## 2024-03-11 ASSESSMENT — SOCIAL DETERMINANTS OF HEALTH (SDOH): HOW OFTEN DO YOU GET TOGETHER WITH FRIENDS OR RELATIVES?: MORE THAN THREE TIMES A WEEK

## 2024-03-11 NOTE — PROGRESS NOTES
Preventive Care Visit  [facility]  Susu Youngblood MD, Family Medicine  Mar 11, 2024    Assessment & Plan     1. Primary hypertension  Plan:well controlled   - lisinopril (ZESTRIL) 20 MG tablet; Take 1 tablet (20 mg) by mouth daily  Dispense: 90 tablet; Refill: 3    2. Gastroesophageal reflux disease without esophagitis  Plan: refilled for 1 yr  - omeprazole (PRILOSEC) 40 MG DR capsule; Take 1 capsule (40 mg) by mouth daily  Dispense: 90 capsule; Refill: 3    3. Immunosuppression (H24)  4. Kidney replaced by transplant  Plan: - considered this problem when making today's plans       -followed by specialist/ Shimon Hamilton MD      5. Need for vaccination  Plan: advised covid and pneumonia and patient willing to get it in futre at the pharmacy    6. Screen for colon cancer  - COLOGUARD([employer]); Future      Patient has been advised of split billing requirements and indicates understanding: Yes  Prescription drug management  I spent a total of 32 minutes on the day of the visit.   Time spent by me doing chart review, history and exam, documentation and further activities per the note      Counseling  Appropriate preventive services were discussed with this patient, including applicable screening as appropriate for fall prevention, nutrition, physical activity, Tobacco-use cessation, weight loss and cognition.  Checklist reviewing preventive services available has been given to the patient.  Reviewed patient's diet, addressing concerns and/or questions.       Work on weight loss  Regular exercise    Justen Saini is a 44 year old, presenting for the following:  Physical         Health Care Directive  Patient does not have a Health Care Directive or Living Will: Discussed advance care planning with patient; however, patient declined at this time.    HPI  Reports overall in good state of health  Flows nephrologist-every 3 months  Shimon Hamilton MD      Needs refill for the  year on lisinopril and omeprazole  Has not decided about completing cologard- had one box 8 mths ago- willing to get a new one    Has deferred covid and pneumonia vaccine     Lipid completed on 1/15/2024 Total cholesterol 222  T, HDL: 53. LDL: 144,  Was given atorvastatin & notes indicates that he was willing to consider it and he reports he has made life style changes- such as avoiding eating too much goat eat.              3/11/2024   General Health   How would you rate your overall physical health? Excellent   Feel stress (tense, anxious, or unable to sleep) Not at all         3/11/2024   Nutrition   Diet: Regular (no restrictions)    Low salt    Vegetarian/vegan         3/11/2024   Exercise   Days per week of moderate/strenous exercise 4 days   Average minutes spent exercising at this level 30 min         3/11/2024   Social Factors   Frequency of gathering with friends or relatives More than three times a week   Worry food won't last until get money to buy more No   Food not last or not have enough money for food? No   Do you have housing?  No   Are you worried about losing your housing? No   Lack of transportation? No   Unable to get utilities (heat,electricity)? No   Want help with housing or utility concern? No   (!) HOUSING CONCERN PRESENT       No data to display                   3/11/2024   Dental   Dentist two times every year? Yes            Today's PHQ-2 Score:       3/11/2024     9:27 AM   PHQ-2 (  [employer])   Q1: Little interest or pleasure in doing things 0   Q2: Feeling down, depressed or hopeless 0   PHQ-2 Score 0   Q1: Little interest or pleasure in doing things Not at all   Q2: Feeling down, depressed or hopeless Not at all   PHQ-2 Score 0           3/11/2024   Substance Use   Alcohol more than 3/day or more than 7/wk No   Do you use any other substances recreationally? No     Social History     Tobacco Use    Smoking status: Never    Smokeless tobacco: Never   Vaping Use    Vaping Use:  "Never used   Substance Use Topics    Alcohol use: No     Alcohol/week: 0.0 standard drinks of alcohol    Drug use: No       ASCVD Risk   The 10-year ASCVD risk score (David RAMOS, et al., 2019) is: 3.4%    Values used to calculate the score:      Age: 44 years      Sex: Male      Is Non- : No      Diabetic: No      Tobacco smoker: No      Systolic Blood Pressure: 131 mmHg      Is BP treated: Yes      HDL Cholesterol: 48 mg/dL      Total Cholesterol: 219 mg/dL        3/11/2024   Contraception/Family Planning   Questions about contraception or family planning No        Reviewed and updated as needed this visit by Provider   Tobacco  Allergies  Meds  Problems  Med Hx  Surg Hx  Fam Hx                  Review of Systems  Constitutional, neuro, ENT, endocrine, pulmonary, cardiac, gastrointestinal, genitourinary, musculoskeletal, integument and psychiatric systems are negative, except as otherwise noted.     Objective    Exam  /87   Pulse 65   Temp 97.5  F (36.4  C) (Temporal)   Resp 18   Ht 1.778 m (5' 10\")   Wt 65.6 kg (144 lb 9.6 oz)   SpO2 98%   BMI 20.75 kg/m     Estimated body mass index is 20.75 kg/m  as calculated from the following:    Height as of this encounter: 1.778 m (5' 10\").    Weight as of this encounter: 65.6 kg (144 lb 9.6 oz).    Physical Exam  GENERAL: alert and no distress  NECK: no adenopathy, no asymmetry, masses, or scars  RESP: lungs clear to auscultation - no rales, rhonchi or wheezes  CV: regular rate and rhythm, normal S1 S2, no S3 or S4, no murmur, click or rub, no peripheral edema      Signed Electronically by: Susu Youngblood MD    "

## 2024-03-11 NOTE — PATIENT INSTRUCTIONS
Please get covid and pneumonia 20 vaccination at local pharmacy .    Return office visit : once a year to review the prescribed medications and Blood pressure

## 2024-03-11 NOTE — COMMUNITY RESOURCES LIST (ENGLISH)
03/11/2024   [facility]  N/A  For questions about this resource list or additional care needs, please contact your primary care clinic or care manager.  Phone: [phone]   Email: N/A   Address: [address] [place] [id number]   Hours: N/A        Hotlines and Helplines       Hotline - Housing crisis  1  [facility] Distance: 0.97 miles      Phone/Virtual   [address] [place] [address]  Language: English  Hours: Mon - Sun Open 24 Hours   Phone: (739) 124-4023 Email: communications@[facility].org Website: https://[facility]-[place].org/oursaviourshousing/     2  [facility] Distance: 1.19 miles      Phone/Virtual   [address] [place] [address]  Language: English  Hours: Mon - Sun Open 24 Hours  Fees: Free   Phone: (459) 795-9395 Email: info@[employer] Website: http://www.[employer]/          Housing       Coordinated Entry access point  3  [facility] [place] ([facility] - [facility] Services Distance: 1.17 miles      In-Person   [address] [place] [address]  Language: English  Hours: Mon - Fri 9:00 AM - 5:00 PM  Fees: Free   Phone: (999) 338-1937 Email: housing@[facility].org Website: http://www.[facility].org/housing     4  [facility] - Adult long-term [facility] Distance: 1.19 miles      In-Person   [address] [place] [address]  Language: English  Hours: Mon - Sat 10:00 PM - 5:00 PM  Fees: Free   Phone: (374) 403-3523 Email: info@[employer].org Website: http://www.[facility].org/     Drop-in center or day shelter  5  [facility] [employer] - [facility] Drop-In Center Distance: 0.54 miles      Phone/Virtual   [address] [facility] [address] [place] [address]  Language: English  Hours: Mon 12:00 PM - 5:00 PM , Tue 11:00 AM - 6:00 PM , Thu 11:00 AM - 6:00 PM , Fri 12:00 PM - 5:00 PM , Sat 10:00 AM - 5:00 PM  Fees: Free   Phone: (992) 530-5612 Email: general@[employer]  Website: http://www.[facility].org/[facility]-community-support-program/     6  [facility] Distance: 0.8 miles      In-Person   [address] [place] [address]  Language: English, Argentine, Norwegian  Hours: Mon - Sat 7:00 AM - 3:00 PM  Fees: Free, Self Pay   Phone: (907) 549-6484 Email: info@[employer] Website: https://www.[employer]/locations/opportunity-center/     Housing search assistance  7  [facility] ([facility]) Distance: 0.54 miles      In-Person   [address] [place] [address]  Language: English, Argentine, Norwegian  Hours: Mon - Fri 8:30 AM - 4:30 PM  Fees: Free   Phone: (471) 405-2234 Email: migue@[facility].org Website: http://www.[facility]mn.PlayerLync/     8  [facility] - Homeless Elder Services Distance: 0.58 miles      In-Person   [address], [place] [address]  Language: English  Hours: Mon - Fri 8:00 AM - 4:30 PM  Fees: Free   Phone: (854) 571-7304 Email: info@[employer] Website: https://[employer]/     Shelter for families  9  [facility] Distance: 17.97 miles      In-Person   [address] [place] [address]  Language: English  Hours: Mon - Fri 3:00 PM - 9:00 AM , Sat - Sun Open 24 Hours  Fees: Free   Phone: (866) 573-6285 Ext.1 Website: https://www.[facility].org/2020/07/03/emergency-family-shelter/     Shelter for individuals  10  [facility] Distance: 0.97 miles      In-Person   [address] [place] [address]  Language: English  Hours: Mon - Sun Open 24 Hours  Fees: Free   Phone: (227) 570-1507 Email: communications@[facility].org Website: https://[facility].org/tazaviourshousing/     11  [facility] Distance: 1.71 miles      In-Person   [address] [place] [address]  Language: English  Hours: Mon - Fri 4:00 PM - 9:00 AM  Fees: Free   Phone: (766) 870-5347 Email:  blair@[facility].org Website: https://[facility].org/[facility]/          Important Numbers & Websites       Emergency Services   911  City Services   311  Poison Control   (682) 305-8710  Suicide Prevention Lifeline   (468) 816-9781 (TALK)  Child Abuse Hotline   (390) 793-6850 (4-A-Child)  Sexual Assault Hotline   (996) 183-6176 (HOPE)  National Runaway Safeline   (444) 287-7442 (RUNAWAY)  All-Options Talkline   (431) 359-5989  Substance Abuse Referral   (552) 497-9758 (HELP)

## 2024-04-13 ENCOUNTER — HEALTH MAINTENANCE LETTER (OUTPATIENT)
Age: 44
End: 2024-04-13

## 2024-11-25 ENCOUNTER — THERAPY VISIT (OUTPATIENT)
Dept: PHYSICAL THERAPY | Facility: CLINIC | Age: 44
End: 2024-11-25
Payer: COMMERCIAL

## 2024-11-25 DIAGNOSIS — M25.512 LEFT SHOULDER PAIN: Primary | ICD-10-CM

## 2024-11-25 PROCEDURE — 97110 THERAPEUTIC EXERCISES: CPT | Mod: GP | Performed by: PHYSICAL THERAPIST

## 2024-11-25 PROCEDURE — 97161 PT EVAL LOW COMPLEX 20 MIN: CPT | Mod: GP | Performed by: PHYSICAL THERAPIST

## 2024-11-25 ASSESSMENT — ACTIVITIES OF DAILY LIVING (ADL)
PLACING_AN_OBJECT_ON_A_HIGH_SHELF: 3
PLEASE_INDICATE_YOR_PRIMARY_REASON_FOR_REFERRAL_TO_THERAPY:: SHOULDER
WASHING_YOUR_HAIR?: 3
AT_ITS_WORST?: 5
REMOVING_SOMETHING_FROM_YOUR_BACK_POCKET: 0
REACHING_FOR_SOMETHING_ON_A_HIGH_SHELF: 4
WHEN_LYING_ON_THE_INVOLVED_SIDE: 3
PUTTING_ON_A_SHIRT_THAT_BUTTONS_DOWN_THE_FRONT: 2
CARRYING_A_HEAVY_OBJECT_OF_10_POUNDS: 6
TOUCHING_THE_BACK_OF_YOUR_NECK: 3
PUTTING_ON_YOUR_PANTS: 2
PUTTING_ON_AN_UNDERSHIRT_OR_A_PULLOVER_SWEATER: 2
WASHING_YOUR_BACK: 3
PUSHING_WITH_THE_INVOLVED_ARM: 4
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
## 2024-12-18 ENCOUNTER — PATIENT OUTREACH (OUTPATIENT)
Dept: CARE COORDINATION | Facility: CLINIC | Age: 44
End: 2024-12-18
Payer: COMMERCIAL

## 2025-03-03 ENCOUNTER — OFFICE VISIT (OUTPATIENT)
Dept: FAMILY MEDICINE | Facility: CLINIC | Age: 45
End: 2025-03-03
Payer: COMMERCIAL

## 2025-03-03 ENCOUNTER — NURSE TRIAGE (OUTPATIENT)
Dept: NURSING | Facility: CLINIC | Age: 45
End: 2025-03-03

## 2025-03-03 VITALS
TEMPERATURE: 98.7 F | WEIGHT: 144.6 LBS | SYSTOLIC BLOOD PRESSURE: 124 MMHG | OXYGEN SATURATION: 100 % | DIASTOLIC BLOOD PRESSURE: 79 MMHG | HEART RATE: 73 BPM | BODY MASS INDEX: 20.7 KG/M2 | RESPIRATION RATE: 16 BRPM | HEIGHT: 70 IN

## 2025-03-03 DIAGNOSIS — A05.8 YERSINIA ENTEROCOLITICA FOOD POISONING: ICD-10-CM

## 2025-03-03 DIAGNOSIS — Z94.0 HISTORY OF KIDNEY TRANSPLANT: ICD-10-CM

## 2025-03-03 DIAGNOSIS — I10 HYPERTENSION, UNSPECIFIED TYPE: ICD-10-CM

## 2025-03-03 DIAGNOSIS — R19.7 ACUTE DIARRHEA: Primary | ICD-10-CM

## 2025-03-03 LAB
ADV 40+41 DNA STL QL NAA+NON-PROBE: NEGATIVE
ANION GAP SERPL CALCULATED.3IONS-SCNC: 12 MMOL/L (ref 7–15)
ASTRO TYP 1-8 RNA STL QL NAA+NON-PROBE: NEGATIVE
BUN SERPL-MCNC: 16 MG/DL (ref 6–20)
C CAYETANENSIS DNA STL QL NAA+NON-PROBE: NEGATIVE
CALCIUM SERPL-MCNC: 10 MG/DL (ref 8.8–10.4)
CAMPYLOBACTER DNA SPEC NAA+PROBE: NEGATIVE
CHLORIDE SERPL-SCNC: 103 MMOL/L (ref 98–107)
CREAT SERPL-MCNC: 1.31 MG/DL (ref 0.67–1.17)
CRYPTOSP DNA STL QL NAA+NON-PROBE: NEGATIVE
E COLI O157 DNA STL QL NAA+NON-PROBE: ABNORMAL
E HISTOLYT DNA STL QL NAA+NON-PROBE: NEGATIVE
EAEC ASTA GENE ISLT QL NAA+PROBE: NEGATIVE
EC STX1+STX2 GENES STL QL NAA+NON-PROBE: NEGATIVE
EGFRCR SERPLBLD CKD-EPI 2021: 68 ML/MIN/1.73M2
EPEC EAE GENE STL QL NAA+NON-PROBE: POSITIVE
ETEC LTA+ST1A+ST1B TOX ST NAA+NON-PROBE: NEGATIVE
G LAMBLIA DNA STL QL NAA+NON-PROBE: NEGATIVE
GLUCOSE SERPL-MCNC: 104 MG/DL (ref 70–99)
HCO3 SERPL-SCNC: 24 MMOL/L (ref 22–29)
NOROVIRUS GI+II RNA STL QL NAA+NON-PROBE: NEGATIVE
P SHIGELLOIDES DNA STL QL NAA+NON-PROBE: NEGATIVE
POTASSIUM SERPL-SCNC: 5.1 MMOL/L (ref 3.4–5.3)
RVA RNA STL QL NAA+NON-PROBE: NEGATIVE
SALMONELLA SP RPOD STL QL NAA+PROBE: NEGATIVE
SAPO I+II+IV+V RNA STL QL NAA+NON-PROBE: NEGATIVE
SHIGELLA SP+EIEC IPAH ST NAA+NON-PROBE: NEGATIVE
SODIUM SERPL-SCNC: 139 MMOL/L (ref 135–145)
V CHOLERAE DNA SPEC QL NAA+PROBE: NEGATIVE
VIBRIO DNA SPEC NAA+PROBE: NEGATIVE
Y ENTEROCOL DNA STL QL NAA+PROBE: POSITIVE

## 2025-03-03 PROCEDURE — 3074F SYST BP LT 130 MM HG: CPT | Performed by: NURSE PRACTITIONER

## 2025-03-03 PROCEDURE — 80048 BASIC METABOLIC PNL TOTAL CA: CPT | Performed by: NURSE PRACTITIONER

## 2025-03-03 PROCEDURE — 99214 OFFICE O/P EST MOD 30 MIN: CPT | Performed by: NURSE PRACTITIONER

## 2025-03-03 PROCEDURE — 3078F DIAST BP <80 MM HG: CPT | Performed by: NURSE PRACTITIONER

## 2025-03-03 PROCEDURE — 87507 IADNA-DNA/RNA PROBE TQ 12-25: CPT | Performed by: NURSE PRACTITIONER

## 2025-03-03 PROCEDURE — 36415 COLL VENOUS BLD VENIPUNCTURE: CPT | Performed by: NURSE PRACTITIONER

## 2025-03-03 NOTE — PROGRESS NOTES
Assessment & Plan     (R19.7) Acute diarrhea  (primary encounter diagnosis)  Comment: Seven-day history of abdominal pain, cramping, and watery non-bloody diarrhea. No fever, chills, or recent travel. Ate at a restaurant the day before symptoms started. Mild dryness of mouth due to decreased fluid intake.  Plan: REVIEW OF HEALTH MAINTENANCE PROTOCOL ORDERS,         Enteric Bacteria and Virus Panel by LEOBARDO Stool  -Ordered stool culture to rule out infectious causes.  -Ordered basic metabolic panel to assess kidney function and electrolyte balance.  -Encourage oral hydration to prevent dehydration.  -Advise BRAT diet (bananas, rice, applesauce, toast) and avoidance of dairy, caffeine, and fatty foods.  -Schedule follow-up appointment in 1-2 weeks. If no improvement, follow up with lab work results.    (Z94.0) History of kidney transplant  Comment: Last GFR and kidney function tests were unremarkable six months ago.  PLAN:   -Check basic metabolic panel   -Encourage fluid intake due to mild dryness of skin and mouth.    (I10) Hypertension, unspecified type  Comment: Patient is not monitoring blood pressure at home. No reported side effects from Lisinopril.    Plan: BASIC METABOLIC PANEL, REVIEW OF HEALTH         MAINTENANCE PROTOCOL ORDERS  -Encouraged patient to monitor blood pressure at home.                Justen Saini is a 45 year old, presenting for the following health issues:  Diarrhea (About a week) and Abdominal Pain (About a week)        3/3/2025     9:16 AM   Additional Questions   Roomed by Mary JOSÉ CMA         3/3/2025     9:16 AM   Patient Reported Additional Medications   Patient reports taking the following new medications Carissa Calvin is a 45 year old male with a history of kidney transplant and hypertension who presents with abdominal pain and diarrhea.    He has been experiencing abdominal pain, cramping, and watery, non-bloody diarrhea for the past seven days, with multiple  "episodes of diarrhea today. No fever or chills are present, but there is a decreased appetite, although he is still able to tolerate fluids. He attempted to fast for Ramadan but was unable to continue due to the constant liquid stools and diarrhea.    He denies recent travel, hospitalization, or antibiotic use. He also denies eating street food but did eat at a restaurant the day before his symptoms began, where he consumed fruit.    He experiences dry mouth and mild skin dryness, which he attributes to his current condition.    He has a history of hypertension and is currently taking lisinopril. He does not check his blood pressure regularly and denies any side effects from the medication. No chest pain, shortness of breath, palpitations, lower extremity edema, or headaches.    He has a history of kidney transplant with stable function as per the last evaluation six months ago. Current concern is the impact of diarrhea on kidney function and electrolyte balance             Review of Systems  Constitutional, HEENT, cardiovascular, pulmonary, GI, , musculoskeletal, neuro, skin, endocrine and psych systems are negative, except as otherwise noted.      Objective    /79   Pulse 73   Temp 98.7  F (37.1  C) (Temporal)   Resp 16   Ht 1.778 m (5' 10\")   Wt 65.6 kg (144 lb 9.6 oz)   SpO2 100%   BMI 20.75 kg/m    Body mass index is 20.75 kg/m .  Physical Exam  Constitutional:       Appearance: Normal appearance.   HENT:      Head: Normocephalic and atraumatic.      Mouth/Throat:      Mouth: Mucous membranes are dry.   Cardiovascular:      Rate and Rhythm: Normal rate and regular rhythm.      Pulses: Normal pulses.      Heart sounds: Normal heart sounds. No murmur heard.  Pulmonary:      Effort: Pulmonary effort is normal.      Breath sounds: Normal breath sounds.   Abdominal:      General: Abdomen is flat. Bowel sounds are normal. There is no distension.      Palpations: Abdomen is soft.      Tenderness: There " is no abdominal tenderness. There is no right CVA tenderness or guarding.   Musculoskeletal:      Cervical back: Normal range of motion and neck supple.   Skin:     General: Skin is dry.      Capillary Refill: Capillary refill takes less than 2 seconds.      Comments:  dryness noted, no rashes or lesions  - Hydration status:  (dry mucous membranes, skin turgor decreased)   Neurological:      Mental Status: He is alert and oriented to person, place, and time.   Psychiatric:         Mood and Affect: Mood normal.         Behavior: Behavior normal.         Thought Content: Thought content normal.         Judgment: Judgment normal.                        Signed Electronically by: IFTIKHAR Sommer CNP

## 2025-03-03 NOTE — TELEPHONE ENCOUNTER
Triage call  Diarrhea and  abdominal pain  had  since 2/25/2025.  He started out feeling cold and  headache. It progressed to watery diarrhea and intermittent abdominal pain  that he rates a 5-6/10 and it gets better when he has a bowel movement,.    Per protocol see PCP with in 24 hours.  Care advice given.  Verbalizes understanding and agrees with plan.transferred to scheduling.    Irena Chambers RN   [facility] Nurse Advisor  6:54 AM 3/3/2025      Reason for Disposition   [1] MODERATE diarrhea (e.g., 4-6 times / day more than normal) AND [2] present > 48 hours (2 days)    Additional Information   Negative: Shock suspected (e.g., cold/pale/clammy skin, too weak to stand, low BP, rapid pulse)   Negative: Difficult to awaken or acting confused (e.g., disoriented, slurred speech)   Negative: Sounds like a life-threatening emergency to the triager   Negative: Vomiting also present and worse than the diarrhea   Negative: [1] Blood in stool AND [2] without diarrhea   Negative: Diarrhea in a cancer patient who is currently (or recently) receiving chemotherapy or radiation therapy, or cancer patient who has metastatic or end-stage cancer and is receiving palliative care   Negative: Diarrhea begins while taking an antibiotic by mouth (oral antibiotic)   Negative: [1] SEVERE abdominal pain (e.g., excruciating) AND [2] present > 1 hour   Negative: [1] SEVERE abdominal pain AND [2] age > 60 years   Negative: [1] Blood in the stool AND [2] moderate or large amount of blood   Negative: Black or tarry bowel movements  (Exception: Chronic-unchanged black-grey BMs AND is taking iron pills or Pepto-Bismol.)   Negative: [1] Drinking very little AND [2] dehydration suspected (e.g., no urine > 12 hours, very dry mouth, very lightheaded)   Negative: Patient sounds very sick or weak to the triager   Negative: [1] SEVERE diarrhea (e.g., 7 or more times / day more than normal) AND [2] age > 60 years   Negative: [1] Constant  abdominal pain AND [2] present > 2 hours   Negative: [1] Fever > 103 F (39.4 C) AND [2] not able to get the fever down using Fever Care Advice   Negative: [1] SEVERE diarrhea (e.g., 7 or more times / day more than normal) AND [2] present > 24 hours (1 day)    Protocols used: Diarrhea-A-AH

## 2025-03-04 RX ORDER — DOXYCYCLINE 100 MG/1
100 CAPSULE ORAL 2 TIMES DAILY
Qty: 10 CAPSULE | Refills: 0 | Status: SHIPPED | OUTPATIENT
Start: 2025-03-04 | End: 2025-03-04

## 2025-03-04 RX ORDER — DOXYCYCLINE 100 MG/1
100 CAPSULE ORAL 2 TIMES DAILY
Qty: 10 CAPSULE | Refills: 0 | Status: SHIPPED | OUTPATIENT
Start: 2025-03-04

## 2025-03-19 ENCOUNTER — TELEPHONE (OUTPATIENT)
Dept: DERMATOLOGY | Facility: CLINIC | Age: 45
End: 2025-03-19
Payer: COMMERCIAL

## 2025-03-19 NOTE — TELEPHONE ENCOUNTER
[facility] Call Center    Phone Message    May a detailed message be left on voicemail: yes     Reason for Call: Symptoms or Concerns     If patient has red-flag symptoms, warm transfer to triage line    Current symptom or concern: The area of the acromioclavicular area pt has a skin lesion that comes and goes and is painful at times. It also drains at times. Pt is a transplant pt as well.     Symptoms have been present for:  1+ years(s)    Has patient previously been seen for this? No    By : NA    Date: NA    Are there any new or worsening symptoms? Yes: pt is scheduled first available in [place] 09/30/25 and wait listed. Pt prefers scheduling at the [facility] location. Pt also prefers to schedule on Mondays and Tuesdays. Thanks                                                                                                                           Action Taken: Message routed to:  [facility] derm    Travel Screening: Not Applicable     Date of Service:

## 2025-03-19 NOTE — TELEPHONE ENCOUNTER
"Patient states he has a \"bump behind my neck\" and he has had this lesion for over a year. He says it is painful and sometimes fluid comes from it. Pt has had kidney transplant.   Scheduled sooner appt for evaluation of area.   "

## 2025-04-15 ENCOUNTER — OFFICE VISIT (OUTPATIENT)
Dept: DERMATOLOGY | Facility: CLINIC | Age: 45
End: 2025-04-15
Payer: COMMERCIAL

## 2025-04-15 DIAGNOSIS — L72.9 CYST OF SKIN: Primary | ICD-10-CM

## 2025-04-15 DIAGNOSIS — L72.0 EIC (EPIDERMAL INCLUSION CYST): ICD-10-CM

## 2025-04-15 PROCEDURE — 88304 TISSUE EXAM BY PATHOLOGIST: CPT | Mod: TC | Performed by: STUDENT IN AN ORGANIZED HEALTH CARE EDUCATION/TRAINING PROGRAM

## 2025-04-15 PROCEDURE — 88304 TISSUE EXAM BY PATHOLOGIST: CPT | Mod: 26 | Performed by: DERMATOLOGY

## 2025-04-15 ASSESSMENT — PAIN SCALES - GENERAL: PAINLEVEL_OUTOF10: NO PAIN (0)

## 2025-04-15 NOTE — PROGRESS NOTES
Lidocaine-epinephrine 1-1:728263 % injection   1.5mL once for one use, starting 4/15/2025 ending 4/15/2025,  2mL disp, R-0, injection  Injected by Dr. Acosta

## 2025-04-15 NOTE — PROGRESS NOTES
I have personally examined this patient and agree with the medical student's documentation and plan of care. I have reviewed and amended the medical student's note as necessary. I personally performed all procedure(s).The documentation accurately reflects my clinical observations, diagnoses, treatment and follow-up plans.     Johnathan Acosta M.D.   Dermatology Staff       [facility] Dermatology Note  Encounter Date: Apr 15, 2025  Office Visit     Dermatology Problem List:  1. Cystic lesion - 4/15/2025     ____________________________________________    Assessment & Plan:    # Cystic lesion   - discussed risks and benefits of surgery   - risk factors include \Transplant pt, immunosuppressed  - pt wishes to proceed      Procedures Performed:   After discussion of benefits and risks including but not limited to bleeding, infection, scar, incomplete removal, recurrence, and non-diagnostic excision, written consent and photographs were obtained. The area was cleaned with isopropyl alcohol. 1 mL of 1% lidocaine with epinephrine was injected to obtain adequate anesthesia of the lesion on the back . A 6 mm punch excision was performed. 4-0 ethilon sutures were utilized to approximate the epidermal edges. White petrolatum ointment and a bandage was applied to the wound. Explicit verbal and written wound care instructions were provided. The patient left the dermatology clinic in good condition.      Follow-up: PRN for new or changing lesions    Staff and Medical Student:     Jake Collins, MS3    ____________________________________________    CC: No chief complaint on file.    HPI:  Mr. Yeny Calvin is a(n) 45 year old male with a hx of kidney transplants and currently taking mycophenolic acid and methylprednisilone who presents today as a new patient for evaluation of lesions on his upper back. It began 10 years ago and is characterized by intermittent swollen bumps. No pruritus, bleeding, but there's  "occasional oozing and tenderness. Pushing on it/\"popping\" it does not help and seems to make it worse.     Patient is otherwise feeling well, without additional skin concerns.    Labs:  None reviewed.    Physical Exam:  Vitals: There were no vitals taken for this visit.  SKIN: Focused examination of the neck and upper back was performed.  - Acromioclavicular area contained a smooth round dome shaped tan bump   - No other lesions of concern on areas examined.     Medications:  Current Outpatient Medications   Medication Sig Dispense Refill    doxycycline hyclate (VIBRAMYCIN) 100 MG capsule Take 1 capsule (100 mg) by mouth 2 times daily. 10 capsule 0    lidocaine (LIDODERM) 5 % patch Place 2 patches onto the skin every 24 hours To prevent lidocaine toxicity, patient should be patch free for 12 hrs daily. (Patient taking differently: Place onto the skin every 24 hours. To prevent lidocaine toxicity, patient should be patch free for 12 hrs daily.) 60 patch 0    lisinopril (ZESTRIL) 20 MG tablet Take 1 tablet (20 mg) by mouth daily 90 tablet 3    methocarbamol (ROBAXIN) 750 MG tablet Take 1 tablet (750 mg) by mouth 4 times daily as needed for muscle spasms (Patient not taking: Reported on 3/3/2025) 30 tablet 0    methylPREDNISolone (MEDROL DOSEPAK) 4 MG tablet therapy pack Follow package instructions 21 tablet 0    mycophenolic acid (MYFORTIC - GENERIC EQUIVALENT) 180 MG EC tablet Take 180 mg by mouth      omeprazole (PRILOSEC) 40 MG DR capsule Take 1 capsule (40 mg) by mouth daily 90 capsule 3    PROGRAF 1 MG PO CAPSULE Take 1 mg by mouth 2 times daily        No current facility-administered medications for this visit.      Past Medical History:   Patient Active Problem List   Diagnosis    Undescended testicle    Testicular hypofunction    Atrophy of both testes    End-stage renal disease (H)    Hepatitis B carrier (H)    History of kidney transplant    Hypertension    Seasonal allergic rhinitis    Immunosuppression    " Seasonal allergies    Lumbar radiculopathy    Fall, sequela    Gastroesophageal reflux disease without esophagitis    Left shoulder pain    History of renal transplant     Past Medical History:   Diagnosis Date    Kidney failure     Kidney replaced by transplant 01/09/2007    Formatting of this note might be different from the original.  Renal failure related to unspecified glomerulonephritis.  1/8/2007 living donor. CMV D+R+.  Follows with Dr Hamilton.      S/P kidney transplant     Undescended testes     brought down late, about age 12-14.        CC Referred Self, MD  No address on file on close of this encounter.

## 2025-04-15 NOTE — PATIENT INSTRUCTIONS
Wound Care After a Biopsy    What is a skin biopsy?  A skin biopsy allows the doctor to examine a very small piece of tissue under the microscope to determine the diagnosis and the best treatment for the skin condition. A local anesthetic (numbing medicine) is injected with a very small needle into the skin area to be tested. A small piece of skin is taken from the area. Sometimes a suture (stitch) is used.     What are the risks of a skin biopsy?  I will experience scar, bleeding, swelling, pain, crusting and redness. I may experience incomplete removal or recurrence. Risks of this procedure are excessive bleeding, bruising, infection, nerve damage, numbness, thick (hypertrophic or keloidal) scar and non-diagnostic biopsy.    How should I care for my wound for the first 24 hours?  Keep the wound dry and covered for 24 hours  If it bleeds, hold direct pressure on the area for 15 minutes. If bleeding does not stop, call us or go to the emergency room  Avoid strenuous exercise the first 1-2 days or as your doctor instructs you    How should I care for the wound after 24 hours?  After 24 hours, remove the bandage  You may bathe or shower as normal  If you had a scalp biopsy, you can shampoo as usual and can use shower water to clean the biopsy site daily  Clean the wound once a day with gentle soap and water  Do not scrub, be gentle  Apply white petroleum/Vaseline after cleaning the wound with a cotton swab or a clean finger, and keep the site covered with a Bandaid /bandage. Bandages are not necessary with a scalp biopsy  If you are unable to cover the site with a Bandaid /bandage, re-apply ointment 2-3 times a day to keep the site moist. Moisture will help with healing  Avoid strenuous activity for first 1-2 days  Avoid lakes, rivers, pools, and oceans until the stitches are removed or the site is healed    How do I clean my wound?  Wash hands thoroughly with soap or use hand  before all wound care  Clean  the wound with gentle soap and water  Apply white petroleum/Vaseline  to wound after it is clean  Replace the Bandaid /bandage to keep the wound covered for the first few days or as instructed by your doctor  If you had a scalp biopsy, warm shower water to the area on a daily basis should suffice    What should I use to clean my wound?   Cotton-tipped applicators (Qtips )  White petroleum jelly (Vaseline ). Use a clean new container and use Q-tips to apply.  Bandaids  as needed  Gentle soap     How should I care for my wound long term?  Do not get your wound dirty  Keep up with wound care for one week or until the area is healed.  If you have stitches, stitches need to be removed in 7 days. You may return to our clinic for this or you may have it done locally at your doctor s office.  A small scab will form and fall off by itself when the area is completely healed. The area will be red and will become pink in color as it heals. Sun protection is very important for how your scar will turn out. Sunscreen with an SPF 30 or greater is recommended once the area is healed.  You should have some soreness but it should be mild and slowly go away over several days. Talk to your doctor about using tylenol for pain,    When should I call my doctor?  If you have increased:   Pain or swelling  Pus or drainage (clear or slightly yellow drainage is ok)  Temperature over 100F  Spreading redness or warmth around wound    When will I hear about my results?  The biopsy results can take 2 weeks to come back.  Your results will automatically release to [facility] before your provider has even reviewed them.  The clinic will call you with the results, send you a [facility] message, or have you schedule a follow-up clinic or phone time to discuss the results.  Contact our clinics if you do not hear from us in 2 weeks.    Who should I call with questions?  [facility]: [phone]  [facility]  [place]: [phone]  For urgent needs outside of business hours call the [facility] at [phone] and ask for the dermatology resident on call

## 2025-04-15 NOTE — LETTER
4/15/2025       RE: Yeny Calvin  [address]  [facility] [address]     Dear Colleague,    Thank you for referring your patient, eYny Calvin, to the [facility] [place] at [facility]. Please see a copy of my visit note below.    I have personally examined this patient and agree with the medical student's documentation and plan of care. I have reviewed and amended the medical student's note as necessary. I personally performed all procedure(s).The documentation accurately reflects my clinical observations, diagnoses, treatment and follow-up plans.     Johnathan Acosta M.D.   Dermatology Staff       [facility] Dermatology Note  Encounter Date: Apr 15, 2025  Office Visit     Dermatology Problem List:  1. Cystic lesion - 4/15/2025     ____________________________________________    Assessment & Plan:    # Cystic lesion   - discussed risks and benefits of surgery   - risk factors include \Transplant pt, immunosuppressed  - pt wishes to proceed      Procedures Performed:   After discussion of benefits and risks including but not limited to bleeding, infection, scar, incomplete removal, recurrence, and non-diagnostic excision, written consent and photographs were obtained. The area was cleaned with isopropyl alcohol. 1 mL of 1% lidocaine with epinephrine was injected to obtain adequate anesthesia of the lesion on the back . A 6 mm punch excision was performed. 4-0 ethilon sutures were utilized to approximate the epidermal edges. White petrolatum ointment and a bandage was applied to the wound. Explicit verbal and written wound care instructions were provided. The patient left the dermatology clinic in good condition.      Follow-up: PRN for new or changing lesions    Staff and Medical Student:     Jake Collins, MS3    ____________________________________________    CC: No chief complaint on file.    HPI:  Mr. Yeny CHANEL  "Nikunj is a(n) 45 year old male with a hx of kidney transplants and currently taking mycophenolic acid and methylprednisilone who presents today as a new patient for evaluation of lesions on his upper back. It began 10 years ago and is characterized by intermittent swollen bumps. No pruritus, bleeding, but there's occasional oozing and tenderness. Pushing on it/\"popping\" it does not help and seems to make it worse.     Patient is otherwise feeling well, without additional skin concerns.    Labs:  None reviewed.    Physical Exam:  Vitals: There were no vitals taken for this visit.  SKIN: Focused examination of the neck and upper back was performed.  - Acromioclavicular area contained a smooth round dome shaped tan bump   - No other lesions of concern on areas examined.     Medications:  Current Outpatient Medications   Medication Sig Dispense Refill     doxycycline hyclate (VIBRAMYCIN) 100 MG capsule Take 1 capsule (100 mg) by mouth 2 times daily. 10 capsule 0     lidocaine (LIDODERM) 5 % patch Place 2 patches onto the skin every 24 hours To prevent lidocaine toxicity, patient should be patch free for 12 hrs daily. (Patient taking differently: Place onto the skin every 24 hours. To prevent lidocaine toxicity, patient should be patch free for 12 hrs daily.) 60 patch 0     lisinopril (ZESTRIL) 20 MG tablet Take 1 tablet (20 mg) by mouth daily 90 tablet 3     methocarbamol (ROBAXIN) 750 MG tablet Take 1 tablet (750 mg) by mouth 4 times daily as needed for muscle spasms (Patient not taking: Reported on 3/3/2025) 30 tablet 0     methylPREDNISolone (MEDROL DOSEPAK) 4 MG tablet therapy pack Follow package instructions 21 tablet 0     mycophenolic acid (MYFORTIC - GENERIC EQUIVALENT) 180 MG EC tablet Take 180 mg by mouth       omeprazole (PRILOSEC) 40 MG DR capsule Take 1 capsule (40 mg) by mouth daily 90 capsule 3     PROGRAF 1 MG PO CAPSULE Take 1 mg by mouth 2 times daily        No current facility-administered " medications for this visit.      Past Medical History:   Patient Active Problem List   Diagnosis     Undescended testicle     Testicular hypofunction     Atrophy of both testes     End-stage renal disease (H)     Hepatitis B carrier (H)     History of kidney transplant     Hypertension     Seasonal allergic rhinitis     Immunosuppression     Seasonal allergies     Lumbar radiculopathy     Fall, sequela     Gastroesophageal reflux disease without esophagitis     Left shoulder pain     History of renal transplant     Past Medical History:   Diagnosis Date     Kidney failure      Kidney replaced by transplant 01/09/2007    Formatting of this note might be different from the original.  Renal failure related to unspecified glomerulonephritis.  1/8/2007 living donor. CMV D+R+.  Follows with Dr Hamilton.       S/P kidney transplant      Undescended testes     brought down late, about age 12-14.        CC Referred Self, MD  No address on file on close of this encounter.     Lidocaine-epinephrine 1-1:059324 % injection   1.5mL once for one use, starting 4/15/2025 ending 4/15/2025,  2mL disp, R-0, injection  Injected by Dr. Acosta      Again, thank you for allowing me to participate in the care of your patient.      Sincerely,    Johnathan Acosta MD

## 2025-04-17 LAB
PATH REPORT.COMMENTS IMP SPEC: NORMAL
PATH REPORT.COMMENTS IMP SPEC: NORMAL
PATH REPORT.FINAL DX SPEC: NORMAL
PATH REPORT.GROSS SPEC: NORMAL
PATH REPORT.MICROSCOPIC SPEC OTHER STN: NORMAL
PATH REPORT.RELEVANT HX SPEC: NORMAL

## 2025-04-19 ENCOUNTER — HEALTH MAINTENANCE LETTER (OUTPATIENT)
Age: 45
End: 2025-04-19

## 2025-04-22 ENCOUNTER — ALLIED HEALTH/NURSE VISIT (OUTPATIENT)
Dept: DERMATOLOGY | Facility: CLINIC | Age: 45
End: 2025-04-22
Payer: COMMERCIAL

## 2025-04-22 DIAGNOSIS — Z48.02 VISIT FOR SUTURE REMOVAL: Primary | ICD-10-CM

## 2025-04-22 PROCEDURE — 99207 PR NO CHARGE NURSE ONLY: CPT | Performed by: STUDENT IN AN ORGANIZED HEALTH CARE EDUCATION/TRAINING PROGRAM

## 2025-04-22 NOTE — PROGRESS NOTES
Yeny Calvin comes into clinic today at the request of Dr. Johnathan Acosta, ordering provider for a suture removal:  Incision was dry, clean and intact, incision cleansed with wound cleanser and sutures were removed. Pt tolerated the procedure. 2 sutures removed from the upper back. No signs/symptoms of infection. Steri strips placed.     This service provided today was under the supervising provider of the day Dr. Johnathan Acosta, who was available if needed.    Ramona Lyons RN

## 2025-05-08 ENCOUNTER — TELEPHONE (OUTPATIENT)
Dept: FAMILY MEDICINE | Facility: CLINIC | Age: 45
End: 2025-05-08
Payer: COMMERCIAL

## 2025-05-08 DIAGNOSIS — I10 PRIMARY HYPERTENSION: ICD-10-CM

## 2025-05-08 RX ORDER — LISINOPRIL 20 MG/1
20 TABLET ORAL DAILY
Qty: 30 TABLET | Refills: 0 | Status: SHIPPED | OUTPATIENT
Start: 2025-05-08

## 2025-05-08 RX ORDER — LISINOPRIL 20 MG/1
20 TABLET ORAL DAILY
Qty: 90 TABLET | Refills: 3 | OUTPATIENT
Start: 2025-05-08

## 2025-05-08 NOTE — TELEPHONE ENCOUNTER
Pharmacy calling for refill- patient is on his way to pharmacy right now.  Advised due for appointment- pharmacy will have patient call to schedule.  30 day supply sent.  Reyna ROLLINS RN